# Patient Record
Sex: FEMALE | NOT HISPANIC OR LATINO | Employment: UNEMPLOYED | URBAN - NONMETROPOLITAN AREA
[De-identification: names, ages, dates, MRNs, and addresses within clinical notes are randomized per-mention and may not be internally consistent; named-entity substitution may affect disease eponyms.]

---

## 2021-11-10 ENCOUNTER — TRANSCRIBE ORDERS (OUTPATIENT)
Dept: ADMINISTRATIVE | Facility: HOSPITAL | Age: 66
End: 2021-11-10

## 2021-11-10 DIAGNOSIS — R09.89 DECREASED PULSES IN FEET: Primary | ICD-10-CM

## 2021-11-19 ENCOUNTER — APPOINTMENT (OUTPATIENT)
Dept: ULTRASOUND IMAGING | Facility: HOSPITAL | Age: 66
End: 2021-11-19

## 2021-11-23 ENCOUNTER — APPOINTMENT (OUTPATIENT)
Dept: ULTRASOUND IMAGING | Facility: HOSPITAL | Age: 66
End: 2021-11-23

## 2021-12-02 ENCOUNTER — APPOINTMENT (OUTPATIENT)
Dept: ULTRASOUND IMAGING | Facility: HOSPITAL | Age: 66
End: 2021-12-02

## 2021-12-10 ENCOUNTER — APPOINTMENT (OUTPATIENT)
Dept: ULTRASOUND IMAGING | Facility: HOSPITAL | Age: 66
End: 2021-12-10

## 2021-12-17 ENCOUNTER — APPOINTMENT (OUTPATIENT)
Dept: ULTRASOUND IMAGING | Facility: HOSPITAL | Age: 66
End: 2021-12-17

## 2022-01-04 ENCOUNTER — HOSPITAL ENCOUNTER (OUTPATIENT)
Dept: ULTRASOUND IMAGING | Facility: HOSPITAL | Age: 67
End: 2022-01-04

## 2022-01-13 ENCOUNTER — APPOINTMENT (OUTPATIENT)
Dept: ULTRASOUND IMAGING | Facility: HOSPITAL | Age: 67
End: 2022-01-13

## 2022-01-21 ENCOUNTER — APPOINTMENT (OUTPATIENT)
Dept: ULTRASOUND IMAGING | Facility: HOSPITAL | Age: 67
End: 2022-01-21

## 2022-01-28 ENCOUNTER — APPOINTMENT (OUTPATIENT)
Dept: ULTRASOUND IMAGING | Facility: HOSPITAL | Age: 67
End: 2022-01-28

## 2022-02-10 ENCOUNTER — APPOINTMENT (OUTPATIENT)
Dept: ULTRASOUND IMAGING | Facility: HOSPITAL | Age: 67
End: 2022-02-10

## 2022-02-24 ENCOUNTER — APPOINTMENT (OUTPATIENT)
Dept: ULTRASOUND IMAGING | Facility: HOSPITAL | Age: 67
End: 2022-02-24

## 2022-03-07 ENCOUNTER — APPOINTMENT (OUTPATIENT)
Dept: ULTRASOUND IMAGING | Facility: HOSPITAL | Age: 67
End: 2022-03-07

## 2023-01-05 ENCOUNTER — APPOINTMENT (OUTPATIENT)
Dept: GENERAL RADIOLOGY | Facility: HOSPITAL | Age: 68
DRG: 603 | End: 2023-01-05
Payer: MEDICARE

## 2023-01-05 ENCOUNTER — HOSPITAL ENCOUNTER (INPATIENT)
Facility: HOSPITAL | Age: 68
LOS: 4 days | Discharge: HOME HEALTH | DRG: 603 | End: 2023-01-10
Attending: STUDENT IN AN ORGANIZED HEALTH CARE EDUCATION/TRAINING PROGRAM | Admitting: INTERNAL MEDICINE
Payer: MEDICARE

## 2023-01-05 ENCOUNTER — APPOINTMENT (OUTPATIENT)
Dept: CT IMAGING | Facility: HOSPITAL | Age: 68
DRG: 603 | End: 2023-01-05
Payer: MEDICARE

## 2023-01-05 ENCOUNTER — APPOINTMENT (OUTPATIENT)
Dept: ULTRASOUND IMAGING | Facility: HOSPITAL | Age: 68
DRG: 603 | End: 2023-01-05
Payer: MEDICARE

## 2023-01-05 DIAGNOSIS — N17.9 AKI (ACUTE KIDNEY INJURY): Primary | ICD-10-CM

## 2023-01-05 DIAGNOSIS — L03.90 CELLULITIS, UNSPECIFIED CELLULITIS SITE: ICD-10-CM

## 2023-01-05 DIAGNOSIS — L03.116 CELLULITIS OF LEFT LOWER EXTREMITY: ICD-10-CM

## 2023-01-05 DIAGNOSIS — R53.1 WEAKNESS: ICD-10-CM

## 2023-01-05 LAB
ALBUMIN SERPL-MCNC: 3.4 G/DL (ref 3.5–5.2)
ALBUMIN/GLOB SERPL: 0.6 G/DL
ALP SERPL-CCNC: 193 U/L (ref 39–117)
ALT SERPL W P-5'-P-CCNC: 21 U/L (ref 1–33)
ANION GAP SERPL CALCULATED.3IONS-SCNC: 18.7 MMOL/L (ref 5–15)
AST SERPL-CCNC: 15 U/L (ref 1–32)
BACTERIA UR QL AUTO: ABNORMAL /HPF
BASOPHILS # BLD AUTO: 0.06 10*3/MM3 (ref 0–0.2)
BASOPHILS NFR BLD AUTO: 0.3 % (ref 0–1.5)
BILIRUB SERPL-MCNC: 0.5 MG/DL (ref 0–1.2)
BILIRUB UR QL STRIP: NEGATIVE
BUN SERPL-MCNC: 124 MG/DL (ref 8–23)
BUN/CREAT SERPL: 52.3 (ref 7–25)
CALCIUM SPEC-SCNC: 9.4 MG/DL (ref 8.6–10.5)
CHLORIDE SERPL-SCNC: 84 MMOL/L (ref 98–107)
CLARITY UR: ABNORMAL
CO2 SERPL-SCNC: 22.3 MMOL/L (ref 22–29)
COLOR UR: YELLOW
CREAT SERPL-MCNC: 2.37 MG/DL (ref 0.57–1)
CRP SERPL-MCNC: 4.55 MG/DL (ref 0–0.5)
D-LACTATE SERPL-SCNC: 2.2 MMOL/L (ref 0.5–2)
DEPRECATED RDW RBC AUTO: 48.2 FL (ref 37–54)
EGFRCR SERPLBLD CKD-EPI 2021: 22 ML/MIN/1.73
EOSINOPHIL # BLD AUTO: 0.01 10*3/MM3 (ref 0–0.4)
EOSINOPHIL NFR BLD AUTO: 0.1 % (ref 0.3–6.2)
ERYTHROCYTE [DISTWIDTH] IN BLOOD BY AUTOMATED COUNT: 16 % (ref 12.3–15.4)
ERYTHROCYTE [SEDIMENTATION RATE] IN BLOOD: 83 MM/HR (ref 0–30)
FLUAV RNA RESP QL NAA+PROBE: NOT DETECTED
FLUBV RNA RESP QL NAA+PROBE: NOT DETECTED
GLOBULIN UR ELPH-MCNC: 5.5 GM/DL
GLUCOSE SERPL-MCNC: 215 MG/DL (ref 65–99)
GLUCOSE UR STRIP-MCNC: NEGATIVE MG/DL
HCT VFR BLD AUTO: 41.4 % (ref 34–46.6)
HGB BLD-MCNC: 14.2 G/DL (ref 12–15.9)
HGB UR QL STRIP.AUTO: ABNORMAL
HOLD SPECIMEN: NORMAL
HOLD SPECIMEN: NORMAL
HYALINE CASTS UR QL AUTO: ABNORMAL /LPF
IMM GRANULOCYTES # BLD AUTO: 0.74 10*3/MM3 (ref 0–0.05)
IMM GRANULOCYTES NFR BLD AUTO: 3.7 % (ref 0–0.5)
KETONES UR QL STRIP: NEGATIVE
LEUKOCYTE ESTERASE UR QL STRIP.AUTO: ABNORMAL
LYMPHOCYTES # BLD AUTO: 1.07 10*3/MM3 (ref 0.7–3.1)
LYMPHOCYTES NFR BLD AUTO: 5.4 % (ref 19.6–45.3)
MCH RBC QN AUTO: 28.5 PG (ref 26.6–33)
MCHC RBC AUTO-ENTMCNC: 34.3 G/DL (ref 31.5–35.7)
MCV RBC AUTO: 83.1 FL (ref 79–97)
MONOCYTES # BLD AUTO: 1.18 10*3/MM3 (ref 0.1–0.9)
MONOCYTES NFR BLD AUTO: 5.9 % (ref 5–12)
NEUTROPHILS NFR BLD AUTO: 16.8 10*3/MM3 (ref 1.7–7)
NEUTROPHILS NFR BLD AUTO: 84.6 % (ref 42.7–76)
NITRITE UR QL STRIP: NEGATIVE
NRBC BLD AUTO-RTO: 0 /100 WBC (ref 0–0.2)
NT-PROBNP SERPL-MCNC: 164 PG/ML (ref 0–900)
PH UR STRIP.AUTO: <=5 [PH] (ref 5–8)
PLATELET # BLD AUTO: 429 10*3/MM3 (ref 140–450)
PMV BLD AUTO: 9.6 FL (ref 6–12)
POTASSIUM SERPL-SCNC: 4.1 MMOL/L (ref 3.5–5.2)
PROT SERPL-MCNC: 8.9 G/DL (ref 6–8.5)
PROT UR QL STRIP: ABNORMAL
RBC # BLD AUTO: 4.98 10*6/MM3 (ref 3.77–5.28)
RBC # UR STRIP: ABNORMAL /HPF
REF LAB TEST METHOD: ABNORMAL
SARS-COV-2 RNA RESP QL NAA+PROBE: NOT DETECTED
SODIUM SERPL-SCNC: 125 MMOL/L (ref 136–145)
SP GR UR STRIP: 1.02 (ref 1–1.03)
SQUAMOUS #/AREA URNS HPF: ABNORMAL /HPF
TROPONIN T SERPL-MCNC: <0.01 NG/ML (ref 0–0.03)
UROBILINOGEN UR QL STRIP: ABNORMAL
WBC # UR STRIP: ABNORMAL /HPF
WBC NRBC COR # BLD: 19.86 10*3/MM3 (ref 3.4–10.8)
WHOLE BLOOD HOLD COAG: NORMAL
WHOLE BLOOD HOLD SPECIMEN: NORMAL
YEAST URNS QL MICRO: ABNORMAL /HPF

## 2023-01-05 PROCEDURE — 87040 BLOOD CULTURE FOR BACTERIA: CPT | Performed by: STUDENT IN AN ORGANIZED HEALTH CARE EDUCATION/TRAINING PROGRAM

## 2023-01-05 PROCEDURE — 84484 ASSAY OF TROPONIN QUANT: CPT | Performed by: PHYSICIAN ASSISTANT

## 2023-01-05 PROCEDURE — 71045 X-RAY EXAM CHEST 1 VIEW: CPT

## 2023-01-05 PROCEDURE — 86140 C-REACTIVE PROTEIN: CPT | Performed by: PHYSICIAN ASSISTANT

## 2023-01-05 PROCEDURE — 99285 EMERGENCY DEPT VISIT HI MDM: CPT

## 2023-01-05 PROCEDURE — 99223 1ST HOSP IP/OBS HIGH 75: CPT | Performed by: HOSPITALIST

## 2023-01-05 PROCEDURE — 85025 COMPLETE CBC W/AUTO DIFF WBC: CPT | Performed by: PHYSICIAN ASSISTANT

## 2023-01-05 PROCEDURE — 85652 RBC SED RATE AUTOMATED: CPT | Performed by: PHYSICIAN ASSISTANT

## 2023-01-05 PROCEDURE — 99284 EMERGENCY DEPT VISIT MOD MDM: CPT

## 2023-01-05 PROCEDURE — 93970 EXTREMITY STUDY: CPT

## 2023-01-05 PROCEDURE — 81001 URINALYSIS AUTO W/SCOPE: CPT | Performed by: STUDENT IN AN ORGANIZED HEALTH CARE EDUCATION/TRAINING PROGRAM

## 2023-01-05 PROCEDURE — 93010 ELECTROCARDIOGRAM REPORT: CPT | Performed by: INTERNAL MEDICINE

## 2023-01-05 PROCEDURE — 87086 URINE CULTURE/COLONY COUNT: CPT | Performed by: STUDENT IN AN ORGANIZED HEALTH CARE EDUCATION/TRAINING PROGRAM

## 2023-01-05 PROCEDURE — 73590 X-RAY EXAM OF LOWER LEG: CPT

## 2023-01-05 PROCEDURE — 83880 ASSAY OF NATRIURETIC PEPTIDE: CPT | Performed by: PHYSICIAN ASSISTANT

## 2023-01-05 PROCEDURE — 73552 X-RAY EXAM OF FEMUR 2/>: CPT

## 2023-01-05 PROCEDURE — 83036 HEMOGLOBIN GLYCOSYLATED A1C: CPT | Performed by: HOSPITALIST

## 2023-01-05 PROCEDURE — 70450 CT HEAD/BRAIN W/O DYE: CPT

## 2023-01-05 PROCEDURE — 83605 ASSAY OF LACTIC ACID: CPT | Performed by: STUDENT IN AN ORGANIZED HEALTH CARE EDUCATION/TRAINING PROGRAM

## 2023-01-05 PROCEDURE — 36415 COLL VENOUS BLD VENIPUNCTURE: CPT

## 2023-01-05 PROCEDURE — 80053 COMPREHEN METABOLIC PANEL: CPT | Performed by: PHYSICIAN ASSISTANT

## 2023-01-05 PROCEDURE — 87636 SARSCOV2 & INF A&B AMP PRB: CPT | Performed by: STUDENT IN AN ORGANIZED HEALTH CARE EDUCATION/TRAINING PROGRAM

## 2023-01-05 PROCEDURE — 93005 ELECTROCARDIOGRAM TRACING: CPT | Performed by: PHYSICIAN ASSISTANT

## 2023-01-05 PROCEDURE — 25010000002 VANCOMYCIN 5 G RECONSTITUTED SOLUTION: Performed by: STUDENT IN AN ORGANIZED HEALTH CARE EDUCATION/TRAINING PROGRAM

## 2023-01-05 RX ADMIN — VANCOMYCIN HYDROCHLORIDE 2000 MG: 5 INJECTION, POWDER, LYOPHILIZED, FOR SOLUTION INTRAVENOUS at 22:57

## 2023-01-05 RX ADMIN — SODIUM CHLORIDE 1000 ML: 9 INJECTION, SOLUTION INTRAVENOUS at 22:57

## 2023-01-05 NOTE — ED NOTES
MEDICAL SCREENING:    Reason for Visit: BLE pain and swelling; weakness     Patient initially seen in triage.  The patient was advised further evaluation and diagnostic testing will be needed, some of the treatment and testing will be initiated in the lobby in order to begin the process.  The patient will be returned to the waiting area for the time being and possibly be re-assessed by a subsequent ED provider.  The patient will be brought back to the treatment area in as timely manner as possible.       Hanna Pérez PA  01/05/23 [id number]

## 2023-01-06 ENCOUNTER — APPOINTMENT (OUTPATIENT)
Dept: CARDIOLOGY | Facility: HOSPITAL | Age: 68
DRG: 603 | End: 2023-01-06
Payer: MEDICARE

## 2023-01-06 ENCOUNTER — APPOINTMENT (OUTPATIENT)
Dept: ULTRASOUND IMAGING | Facility: HOSPITAL | Age: 68
DRG: 603 | End: 2023-01-06
Payer: MEDICARE

## 2023-01-06 PROBLEM — L03.119 CELLULITIS OF LOWER EXTREMITY: Status: ACTIVE | Noted: 2023-01-06

## 2023-01-06 LAB
ALBUMIN SERPL-MCNC: 2.8 G/DL (ref 3.5–5.2)
ALBUMIN/GLOB SERPL: 0.6 G/DL
ALP SERPL-CCNC: 163 U/L (ref 39–117)
ALT SERPL W P-5'-P-CCNC: 21 U/L (ref 1–33)
ANION GAP SERPL CALCULATED.3IONS-SCNC: 18.6 MMOL/L (ref 5–15)
ANISOCYTOSIS BLD QL: ABNORMAL
AST SERPL-CCNC: 20 U/L (ref 1–32)
BILIRUB SERPL-MCNC: 0.4 MG/DL (ref 0–1.2)
BUN SERPL-MCNC: 111 MG/DL (ref 8–23)
BUN/CREAT SERPL: 58.1 (ref 7–25)
CALCIUM SPEC-SCNC: 8.9 MG/DL (ref 8.6–10.5)
CHLORIDE SERPL-SCNC: 85 MMOL/L (ref 98–107)
CHOLEST SERPL-MCNC: 214 MG/DL (ref 0–200)
CO2 SERPL-SCNC: 16.4 MMOL/L (ref 22–29)
CREAT SERPL-MCNC: 1.91 MG/DL (ref 0.57–1)
CRP SERPL-MCNC: 3.49 MG/DL (ref 0–0.5)
D-LACTATE SERPL-SCNC: 1.6 MMOL/L (ref 0.5–2)
DEPRECATED RDW RBC AUTO: 48.1 FL (ref 37–54)
EGFRCR SERPLBLD CKD-EPI 2021: 28.5 ML/MIN/1.73
EOSINOPHIL # BLD MANUAL: 0.39 10*3/MM3 (ref 0–0.4)
EOSINOPHIL NFR BLD MANUAL: 2 % (ref 0.3–6.2)
ERYTHROCYTE [DISTWIDTH] IN BLOOD BY AUTOMATED COUNT: 15.9 % (ref 12.3–15.4)
GLOBULIN UR ELPH-MCNC: 5 GM/DL
GLUCOSE BLDC GLUCOMTR-MCNC: 168 MG/DL (ref 70–130)
GLUCOSE BLDC GLUCOMTR-MCNC: 182 MG/DL (ref 70–130)
GLUCOSE BLDC GLUCOMTR-MCNC: 215 MG/DL (ref 70–130)
GLUCOSE BLDC GLUCOMTR-MCNC: 226 MG/DL (ref 70–130)
GLUCOSE SERPL-MCNC: 164 MG/DL (ref 65–99)
HBA1C MFR BLD: 7.6 % (ref 4.8–5.6)
HCT VFR BLD AUTO: 38.8 % (ref 34–46.6)
HDLC SERPL-MCNC: 36 MG/DL (ref 40–60)
HGB BLD-MCNC: 13.3 G/DL (ref 12–15.9)
LDLC SERPL CALC-MCNC: 148 MG/DL (ref 0–100)
LDLC/HDLC SERPL: 4.03 {RATIO}
LYMPHOCYTES # BLD MANUAL: 3.69 10*3/MM3 (ref 0.7–3.1)
LYMPHOCYTES NFR BLD MANUAL: 6 % (ref 5–12)
MCH RBC QN AUTO: 28.6 PG (ref 26.6–33)
MCHC RBC AUTO-ENTMCNC: 34.3 G/DL (ref 31.5–35.7)
MCV RBC AUTO: 83.4 FL (ref 79–97)
METAMYELOCYTES NFR BLD MANUAL: 1 % (ref 0–0)
MONOCYTES # BLD: 1.17 10*3/MM3 (ref 0.1–0.9)
MYELOCYTES NFR BLD MANUAL: 1 % (ref 0–0)
NEUTROPHILS # BLD AUTO: 13.8 10*3/MM3 (ref 1.7–7)
NEUTROPHILS NFR BLD MANUAL: 66 % (ref 42.7–76)
NEUTS BAND NFR BLD MANUAL: 5 % (ref 0–5)
PLAT MORPH BLD: NORMAL
PLATELET # BLD AUTO: 373 10*3/MM3 (ref 140–450)
PMV BLD AUTO: 9.7 FL (ref 6–12)
POTASSIUM SERPL-SCNC: 4 MMOL/L (ref 3.5–5.2)
PROT SERPL-MCNC: 7.8 G/DL (ref 6–8.5)
RBC # BLD AUTO: 4.65 10*6/MM3 (ref 3.77–5.28)
SODIUM SERPL-SCNC: 120 MMOL/L (ref 136–145)
TRIGL SERPL-MCNC: 165 MG/DL (ref 0–150)
VANCOMYCIN SERPL-MCNC: 13.8 MCG/ML (ref 5–40)
VARIANT LYMPHS NFR BLD MANUAL: 19 % (ref 19.6–45.3)
VLDLC SERPL-MCNC: 30 MG/DL (ref 5–40)
WBC NRBC COR # BLD: 19.44 10*3/MM3 (ref 3.4–10.8)

## 2023-01-06 PROCEDURE — 99221 1ST HOSP IP/OBS SF/LOW 40: CPT | Performed by: SURGERY

## 2023-01-06 PROCEDURE — 82962 GLUCOSE BLOOD TEST: CPT

## 2023-01-06 PROCEDURE — 63710000001 INSULIN ASPART PER 5 UNITS: Performed by: HOSPITALIST

## 2023-01-06 PROCEDURE — 86140 C-REACTIVE PROTEIN: CPT | Performed by: HOSPITALIST

## 2023-01-06 PROCEDURE — 80053 COMPREHEN METABOLIC PANEL: CPT | Performed by: HOSPITALIST

## 2023-01-06 PROCEDURE — 80061 LIPID PANEL: CPT | Performed by: HOSPITALIST

## 2023-01-06 PROCEDURE — 97162 PT EVAL MOD COMPLEX 30 MIN: CPT

## 2023-01-06 PROCEDURE — 25010000002 CEFTRIAXONE PER 250 MG: Performed by: HOSPITALIST

## 2023-01-06 PROCEDURE — 25010000002 CEFTRIAXONE PER 250 MG: Performed by: STUDENT IN AN ORGANIZED HEALTH CARE EDUCATION/TRAINING PROGRAM

## 2023-01-06 PROCEDURE — 25010000002 VANCOMYCIN 1 G RECONSTITUTED SOLUTION 1 EACH VIAL: Performed by: HOSPITALIST

## 2023-01-06 PROCEDURE — 76775 US EXAM ABDO BACK WALL LIM: CPT

## 2023-01-06 PROCEDURE — 83605 ASSAY OF LACTIC ACID: CPT | Performed by: STUDENT IN AN ORGANIZED HEALTH CARE EDUCATION/TRAINING PROGRAM

## 2023-01-06 PROCEDURE — 25010000002 MORPHINE PER 10 MG: Performed by: STUDENT IN AN ORGANIZED HEALTH CARE EDUCATION/TRAINING PROGRAM

## 2023-01-06 PROCEDURE — 25010000002 HEPARIN (PORCINE) PER 1000 UNITS: Performed by: HOSPITALIST

## 2023-01-06 PROCEDURE — 76775 US EXAM ABDO BACK WALL LIM: CPT | Performed by: RADIOLOGY

## 2023-01-06 PROCEDURE — 80202 ASSAY OF VANCOMYCIN: CPT | Performed by: HOSPITALIST

## 2023-01-06 PROCEDURE — 85025 COMPLETE CBC W/AUTO DIFF WBC: CPT | Performed by: HOSPITALIST

## 2023-01-06 RX ORDER — SODIUM CHLORIDE 0.9 % (FLUSH) 0.9 %
10 SYRINGE (ML) INJECTION AS NEEDED
Status: DISCONTINUED | OUTPATIENT
Start: 2023-01-06 | End: 2023-01-10 | Stop reason: HOSPADM

## 2023-01-06 RX ORDER — PRAVASTATIN SODIUM 40 MG
20 TABLET ORAL NIGHTLY
Status: DISCONTINUED | OUTPATIENT
Start: 2023-01-06 | End: 2023-01-10 | Stop reason: HOSPADM

## 2023-01-06 RX ORDER — DEXTROSE MONOHYDRATE 25 G/50ML
25 INJECTION, SOLUTION INTRAVENOUS
Status: DISCONTINUED | OUTPATIENT
Start: 2023-01-06 | End: 2023-01-10 | Stop reason: HOSPADM

## 2023-01-06 RX ORDER — AMLODIPINE BESYLATE 10 MG/1
10 TABLET ORAL DAILY
Status: CANCELLED | OUTPATIENT
Start: 2023-01-06

## 2023-01-06 RX ORDER — AMLODIPINE BESYLATE 10 MG/1
10 TABLET ORAL DAILY
Status: ON HOLD | COMMUNITY
End: 2023-01-10 | Stop reason: SDUPTHER

## 2023-01-06 RX ORDER — METOPROLOL TARTRATE 50 MG/1
50 TABLET, FILM COATED ORAL 2 TIMES DAILY
Status: CANCELLED | OUTPATIENT
Start: 2023-01-06

## 2023-01-06 RX ORDER — SODIUM CHLORIDE 0.9 % (FLUSH) 0.9 %
10 SYRINGE (ML) INJECTION EVERY 12 HOURS SCHEDULED
Status: DISCONTINUED | OUTPATIENT
Start: 2023-01-06 | End: 2023-01-10 | Stop reason: HOSPADM

## 2023-01-06 RX ORDER — HEPARIN SODIUM 5000 [USP'U]/ML
5000 INJECTION, SOLUTION INTRAVENOUS; SUBCUTANEOUS EVERY 12 HOURS SCHEDULED
Status: DISCONTINUED | OUTPATIENT
Start: 2023-01-06 | End: 2023-01-10 | Stop reason: HOSPADM

## 2023-01-06 RX ORDER — SODIUM CHLORIDE 9 MG/ML
100 INJECTION, SOLUTION INTRAVENOUS CONTINUOUS
Status: DISCONTINUED | OUTPATIENT
Start: 2023-01-06 | End: 2023-01-09

## 2023-01-06 RX ORDER — AMLODIPINE BESYLATE 10 MG/1
10 TABLET ORAL DAILY
Status: DISCONTINUED | OUTPATIENT
Start: 2023-01-07 | End: 2023-01-10 | Stop reason: HOSPADM

## 2023-01-06 RX ORDER — PRAVASTATIN SODIUM 20 MG
20 TABLET ORAL NIGHTLY
Status: ON HOLD | COMMUNITY
End: 2023-01-10 | Stop reason: SDUPTHER

## 2023-01-06 RX ORDER — METOPROLOL TARTRATE 50 MG/1
50 TABLET, FILM COATED ORAL 2 TIMES DAILY
Status: ON HOLD | COMMUNITY
End: 2023-01-10 | Stop reason: SDUPTHER

## 2023-01-06 RX ORDER — HYDROCHLOROTHIAZIDE 25 MG/1
25 TABLET ORAL DAILY
Status: CANCELLED | OUTPATIENT
Start: 2023-01-06

## 2023-01-06 RX ORDER — PRAVASTATIN SODIUM 40 MG
20 TABLET ORAL NIGHTLY
Status: CANCELLED | OUTPATIENT
Start: 2023-01-06

## 2023-01-06 RX ORDER — NITROGLYCERIN 0.4 MG/1
0.4 TABLET SUBLINGUAL
Status: DISCONTINUED | OUTPATIENT
Start: 2023-01-06 | End: 2023-01-10 | Stop reason: HOSPADM

## 2023-01-06 RX ORDER — HYDROCHLOROTHIAZIDE 25 MG/1
25 TABLET ORAL DAILY
COMMUNITY
End: 2023-01-10 | Stop reason: HOSPADM

## 2023-01-06 RX ORDER — METOPROLOL TARTRATE 50 MG/1
50 TABLET, FILM COATED ORAL 2 TIMES DAILY
Status: DISCONTINUED | OUTPATIENT
Start: 2023-01-06 | End: 2023-01-10 | Stop reason: HOSPADM

## 2023-01-06 RX ORDER — INSULIN ASPART 100 [IU]/ML
0-7 INJECTION, SOLUTION INTRAVENOUS; SUBCUTANEOUS
Status: DISCONTINUED | OUTPATIENT
Start: 2023-01-06 | End: 2023-01-10 | Stop reason: HOSPADM

## 2023-01-06 RX ORDER — NYSTATIN 100000 [USP'U]/G
POWDER TOPICAL EVERY 12 HOURS SCHEDULED
Status: DISCONTINUED | OUTPATIENT
Start: 2023-01-06 | End: 2023-01-10 | Stop reason: HOSPADM

## 2023-01-06 RX ORDER — SODIUM CHLORIDE 9 MG/ML
40 INJECTION, SOLUTION INTRAVENOUS AS NEEDED
Status: DISCONTINUED | OUTPATIENT
Start: 2023-01-06 | End: 2023-01-10 | Stop reason: HOSPADM

## 2023-01-06 RX ORDER — NICOTINE POLACRILEX 4 MG
15 LOZENGE BUCCAL
Status: DISCONTINUED | OUTPATIENT
Start: 2023-01-06 | End: 2023-01-10 | Stop reason: HOSPADM

## 2023-01-06 RX ADMIN — INSULIN ASPART 2 UNITS: 100 INJECTION, SOLUTION INTRAVENOUS; SUBCUTANEOUS at 12:30

## 2023-01-06 RX ADMIN — NYSTATIN: 100000 POWDER TOPICAL at 21:21

## 2023-01-06 RX ADMIN — SODIUM CHLORIDE 75 ML/HR: 9 INJECTION, SOLUTION INTRAVENOUS at 18:16

## 2023-01-06 RX ADMIN — INSULIN ASPART 2 UNITS: 100 INJECTION, SOLUTION INTRAVENOUS; SUBCUTANEOUS at 09:03

## 2023-01-06 RX ADMIN — Medication 10 ML: at 09:04

## 2023-01-06 RX ADMIN — CEFTRIAXONE 1 G: 1 INJECTION, POWDER, FOR SOLUTION INTRAMUSCULAR; INTRAVENOUS at 01:06

## 2023-01-06 RX ADMIN — SODIUM CHLORIDE 75 ML/HR: 9 INJECTION, SOLUTION INTRAVENOUS at 03:20

## 2023-01-06 RX ADMIN — VANCOMYCIN HYDROCHLORIDE 1000 MG: 1 INJECTION, POWDER, LYOPHILIZED, FOR SOLUTION INTRAVENOUS at 18:18

## 2023-01-06 RX ADMIN — Medication 10 ML: at 21:21

## 2023-01-06 RX ADMIN — HEPARIN SODIUM 5000 UNITS: 5000 INJECTION INTRAVENOUS; SUBCUTANEOUS at 21:21

## 2023-01-06 RX ADMIN — MORPHINE SULFATE 4 MG: 4 INJECTION, SOLUTION INTRAMUSCULAR; INTRAVENOUS at 02:03

## 2023-01-06 RX ADMIN — INSULIN ASPART 3 UNITS: 100 INJECTION, SOLUTION INTRAVENOUS; SUBCUTANEOUS at 18:17

## 2023-01-06 RX ADMIN — HEPARIN SODIUM 5000 UNITS: 5000 INJECTION INTRAVENOUS; SUBCUTANEOUS at 09:04

## 2023-01-06 RX ADMIN — CEFTRIAXONE 1 G: 1 INJECTION, POWDER, FOR SOLUTION INTRAMUSCULAR; INTRAVENOUS at 21:21

## 2023-01-06 NOTE — CONSULTS
Consulting physician: SELAM Eubanks and Dr. Armando    Referring physician: Dr. Carpio    Date of consultation: 01/06/23     Chief complaint cellulitis, possible leg abscess at popliteal fossa and left heel    Subjective     Patient is a 67 y.o. female who presents with left leg swelling.  She reports that it has been swollen and draining x4 months.  She also reports that she has had weakness and has been unable to get around with her walker at home.  She does complain of nausea, 2 episodes of vomiting as well as diarrhea. She has chronic lymphedema in the legs due to her obesity and has a lot of varicose veins and venous stasis skin changes in the left leg more than the right. The redness she had above the left knee is improved today after getting IV antibiotics.      Review of Systems  Review of Systems - General ROS: Positive for weakness  Psychological ROS: negative for - behavioral disorder  Ophthalmic ROS: negative for - dry eyes  ENT ROS: negative for - vertigo or vocal changes  Hematological and Lymphatic ROS: negative for - swollen lymph nodes, DVT, PE.   Respiratory ROS: negative for - sputum changes or stridor.  Cardiovascular ROS: negative for - irregular heartbeat or murmur  Gastrointestinal ROS: Positive for nausea vomiting and diarrhea.  Genitourinary ROS: negative for - hematuria or incontinence  Musculoskeletal ROS: Positive for lower extremity swelling.  Integumentary ROS: Positive for redness.    History  Past Medical History:   Diagnosis Date   • Hyperlipidemia    • Hypertension      Past Surgical History:   Procedure Laterality Date   • CHOLECYSTECTOMY       Family History   Problem Relation Age of Onset   • Hypertension Mother      Social History     Tobacco Use   • Smoking status: Never   • Smokeless tobacco: Never   Substance Use Topics   • Alcohol use: Never   • Drug use: Never     No medications prior to admission.     Allergies:  Patient has no known allergies.    Objective     Vital  Signs  Temp:  [96.9 °F (36.1 °C)-98.6 °F (37 °C)] 97.4 °F (36.3 °C)  Heart Rate:  [64-70] 64  Resp:  [18-19] 18  BP: (105-134)/(49-70) 105/49    Physical Exam:  General:  This is an obese female in no acute distress  Vital signs: Stable, afebrile  HEENT exam:  WNL. Sclerae are anicteric.  EOMI  Neck:  Supple, FROM.  No JVD.  Trachea midline  Lungs:  Respiratory effort normal.  Heart:  Regular rate and rhythm, without murmur, gallop, rub.  No pedal edema  Abdomen: Nondistended.  Musculoskeletal:  Muscle strength/tone is normal.    Psych:  Alert, oriented x 3.  Mood and affect are appropriate  Skin:  Warm with good turgor.  Without rash or lesion  Extremities: Cellulitis noted to patient's left upper thigh.  There is drainage noted to posterior left lower extremity around popliteal fossa.  There is no fluctuation noted in this area.  Patient did also have drainage noted around left heel.  There was no fluctuation noted to this area.  Edema noted to bilateral lower extremities.    Results Review:   Results from last 7 days   Lab Units 01/05/23  1838   TROPONIN T ng/mL <0.010     Results from last 7 days   Lab Units 01/06/23  0453 01/06/23  0201 01/05/23  2253 01/05/23  1838   CRP mg/dL 3.49*  --   --  4.55*   LACTATE mmol/L  --  1.6 2.2*  --    WBC 10*3/mm3 19.44*  --   --  19.86*   HEMOGLOBIN g/dL 13.3  --   --  14.2   HEMATOCRIT % 38.8  --   --  41.4   PLATELETS 10*3/mm3 373  --   --  429         Results from last 7 days   Lab Units 01/06/23  0453 01/05/23  1838   SODIUM mmol/L 120* 125*   POTASSIUM mmol/L 4.0 4.1   CHLORIDE mmol/L 85* 84*   CO2 mmol/L 16.4* 22.3   BUN mg/dL 111* 124*   CREATININE mg/dL 1.91* 2.37*   CALCIUM mg/dL 8.9 9.4   GLUCOSE mg/dL 164* 215*   ALBUMIN g/dL 2.8* 3.4*   BILIRUBIN mg/dL 0.4 0.5   ALK PHOS U/L 163* 193*   AST (SGOT) U/L 20 15   ALT (SGPT) U/L 21 21   Estimated Creatinine Clearance: 39.8 mL/min (A) (by C-G formula based on SCr of 1.91 mg/dL (H)).  No results found for:  AMMONIA  Results from last 7 days   Lab Units 01/06/23  0453   CHOLESTEROL mg/dL 214*   TRIGLYCERIDES mg/dL 165*   HDL CHOL mg/dL 36*   LDL CHOL mg/dL 148*     No results found for: BLOODCX  No results found for: URINECX  No results found for: WOUNDCX  No results found for: STOOLCX    Imaging:  Imaging Results (Last 24 Hours)     Procedure Component Value Units Date/Time    XR Tibia Fibula 2 View Right [[id number]] Collected: 01/05/23 2228     Updated: 01/05/23 2230    Narrative:      CR Tibia Fibula 2 Vws RT    INDICATION:   Leg swelling.    COMPARISON:   None available.    FINDINGS:   AP and lateral views of the right tibia/fibula.     Subcutaneous soft tissue edema. No fracture or dislocation. Tricompartmental knee osteoarthrosis, most advanced in the medial compartment. Visualized portions of the ankle joints are in anatomic alignment. No foreign body.      Impression:      1.  Subcutaneous edema. No acute osseous abnormality.  2.  Tricompartmental knee osteoarthrosis.    Signer Name: Ezio Boyer MD   Signed: 1/5/2023 10:28 PM   Workstation Name: [id number]    [facility]    XR Femur 2 View Left [[id number]] Collected: 01/05/23 2227     Updated: 01/05/23 2229    Narrative:      CR Femur 2 Vws LT    INDICATION:   left leg eyrthema and pain    COMPARISON:   None available.    FINDINGS:   AP and lateral views of the left femur.      Evaluation of the proximal femur is limited due to patient body habitus. No fracture or dislocation. Tricompartmental osteoarthrosis of the knee.  No foreign body.      Impression:      1.  Evaluation of the proximal femur is limited due to patient body habitus. No acute osseous findings.  2.  Tricompartmental knee osteoarthrosis.    Signer Name: Ezio Boyer MD   Signed: 1/5/2023 10:27 PM   Workstation Name: [id number]    [facility]    XR Tibia Fibula 2 View Left [[id number]] Collected: 01/05/23 2226     Updated: 01/05/23 2228    Narrative:       CR Tibia Fibula 2 Vws LT    INDICATION:   Erythema.    COMPARISON:   None available.    FINDINGS:   AP and lateral views of the left tibia/fibula.      Subcutaneous soft tissue edema. No fracture or dislocation.  Tricompartmental osteoarthrosis, most advanced in the medial compartment. Visualized ankle joint is anatomic alignment. Plantar calcaneal spur. No foreign body.      Impression:      1.  No fracture or dislocation.  2.  Subcutaneous soft tissue edema.  3.  Tricompartmental knee osteoarthrosis.    Signer Name: Ezio Boyer MD   Signed: 1/5/2023 10:26 PM   Workstation Name: [id number]    [facility]    XR Chest 1 View [[id number]] Collected: 01/05/23 2004     Updated: 01/05/23 2006    Narrative:      CR Chest 1 Vw    INDICATION:   Bilateral lower extremity swelling     COMPARISON:    July 7, 2007    FINDINGS:  Portable AP view(s) of the chest.    The heart size is enlarged. Mediastinal structures are midline. Pulmonary vascularity is normal. The lungs are clear. No pleural fluid. No pneumothorax.      Impression:      Cardiomegaly. No acute infiltrates.    Signer Name: Donte Mahmood MD   Signed: 1/5/2023 8:04 PM   Workstation Name: [id number]    [facility]    US Venous Doppler Lower Extremity Bilateral (duplex) [[id number]] Collected: 01/05/23 2001     Updated: 01/05/23 2003    Narrative:      US Veins LE Duplex BILAT    HISTORY:   Bilateral leg swelling and pain    TECHNIQUE:   Real-time ultrasound was performed of both lower extremities utilizing spectral and color Doppler with compression and augmentation techniques.    COMPARISON:  None available.    FINDINGS:  Right Lower Extremity:  There is no deep venous thrombus seen in the right lower extremity, including the right common femoral veins, right femoral veins and right popliteal veins.  Normal compressibility and respiratory phasicity was visualized.  No calf vein thrombus. Greater  saphenous vein is  patent.    Left Lower Extremity:  There is no deep venous thrombus seen in the left lower extremity, including the left common femoral veins, left femoral veins and left popliteal veins.   Normal compressibility and respiratory phasicity was visualized.  No calf vein thrombus. Greater  saphenous vein is patent.        Impression:      No deep venous thrombus seen in either lower extremity.    Signer Name: Donte Mahmood MD   Signed: 1/5/2023 8:01 PM   Workstation Name: [id number]    [facility]    CT Head Without Contrast [[id number]] Collected: 01/05/23 2000     Updated: 01/05/23 2002    Narrative:      CT Head WO    HISTORY: weakness    COMPARISON: None.    TECHNIQUE: CT of the brain without IV contrast. Coronal and sagittal reconstructions were obtained.  Radiation dose reduction techniques included automated exposure control or exposure modulation based on body size. Count of known CT and cardiac nuc med  studies performed in previous 12 months: 0.     Time of Scan: 7:46 PM    FINDINGS:    No acute intracranial hemorrhage, mass effect, midline shift, or hydrocephalus.     Gray-white matter differentiation is within normal limits. Mild patchy hypodensities in the cerebral white matter, nonspecific but likely representing chronic microvascular ischemic changes. Right thalamic or capsular chronic lacunar infarct.    Ventricles and sulci are normal in size. Basal cisterns are clear.     Calvarium is intact.     Layering fluid in the left sphenoid sinus. Visualized mastoid air cells are clear.      Impression:        1.  No acute intracranial hemorrhage or mass effect.  2.  Chronic microvascular ischemic changes and generalized cerebral atrophy.  3.  Right thalamic or capsular chronic lacunar infarcts.  4.  Layering fluid in the left sphenoid sinus. Correlate with symptoms of sinusitis.    Signer Name: Ezio Boyer MD   Signed: 1/5/2023 8:00 PM   Workstation Name: [id number]    [facility]        Reviewed imaging    Impression: 67-year-old female with cellulitis to left lower extremity  Patient Active Problem List   Diagnosis Code   • Cellulitis of lower extremity L03.119       Plan:  Currently there is no indication of a abscess and incision would just result in a likely non healing wound draining serous fluid . I Recommend elevate the leg when possible and ace wrap or compression stockings. I did discuss the importance of weight loss with the patient if there is to be any long term improvement.       Discussion:       SELAM Vivas  01/06/23  10:58 EST    Time: Time spent:    Please note that portions of this note were completed with a voice recognition program.

## 2023-01-06 NOTE — PLAN OF CARE
Goal Outcome Evaluation:      Pt admitted this shift. Pt is currently resting comfortably. No s/s of acute distress noted at this time. Wound consulted. Will continue with plan of care.

## 2023-01-06 NOTE — PLAN OF CARE
Goal Outcome Evaluation:      Patient has been resting in bed and bedside chair this shift. Wound care completed per orders. No s/s of acute distress noted at this time. Will continue to follow plan of care.

## 2023-01-06 NOTE — NURSING NOTE
Patient with stage 1 PI to her sacrum.  Red and nonblanchable.  2x3cm.  She has a small stage 2 PI to her right gluteal  1x1cm.  Red and nonblanchable.  Will treat both areas with Z-Guard twice daily.    PI prevention orders initiated.    Patient with cellulitis of LLE.  She has an abscess to the back of her knee as well as a large, macerated blister to her left heel.  Surgery has been consulted for these wounds.       01/06/23 1130   Wound 01/06/23 0440 Bilateral midline sacral spine Pressure Injury   Placement Date/Time: 01/06/23 0440   Present on Hospital Admission: Yes  Side: Bilateral  Orientation: midline  Location: sacral spine  Primary Wound Type: Pressure Injury   Pressure Injury Stage 1   Base red;non-blanchable   Periwound intact   Periwound Temperature warm   Periwound Skin Turgor soft   Edges rolled/closed   Wound Length (cm) 2 cm   Wound Width (cm) 3 cm   Wound Surface Area (cm^2) 6 cm^2   Drainage Amount none   Wound 01/06/23 0442 Right medial gluteal Pressure Injury   Placement Date/Time: 01/06/23 0442   Present on Hospital Admission: Yes  Side: Right  Orientation: medial  Location: gluteal  Primary Wound Type: Pressure Injury   Pressure Injury Stage 2   Dressing Appearance open to air   Closure None   Base red;non-blanchable;moist   Periwound intact   Periwound Temperature warm   Periwound Skin Turgor soft   Edges open   Wound Length (cm) 1 cm   Wound Width (cm) 1 cm   Wound Depth (cm) 0.1 cm   Wound Surface Area (cm^2) 1 cm^2   Wound Volume (cm^3) 0.1 cm^3   Drainage Amount none

## 2023-01-06 NOTE — PAYOR COMM NOTE
"[facility]  NPI:[id number]    Utilization Review  Contact: Tatiana Varela RN  Phone: [phone]  Fax:[phone]    INITIATE INPATIENT AUTHORIZATION    Lynnette Santos (67 y.o. Female)     Date of Birth   1955    Social Security Number       Address   [address] [place] [address]    Home Phone   [phone]    MRN   [id number]       Spiritism   None    Marital Status   Unknown                            Admission Date   1/5/23    Admission Type   Emergency    Admitting Provider   Bib Parsons MD    Attending Provider   Mariam Carpio DO    Department, Room/Bed   [facility], 3315/1S       Discharge Date       Discharge Disposition       Discharge Destination                               Attending Provider: Mariam Carpio DO    Allergies: No Known Allergies    Isolation: None   Infection: None   Code Status: CPR    Ht: 165.1 cm (65\")   Wt: 135 kg (297 lb 4.8 oz)    Admission Cmt: None   Principal Problem: Cellulitis of lower extremity [L03.119]                 Active Insurance as of 1/5/2023     Primary Coverage     Payor Plan Insurance Group Employer/Plan Group    [facility] MEDICARE REPLACEMENT [employer] MEDICARE REPLACEMENT       Payor Plan Address Payor Plan Phone Number Payor Plan Fax Number Effective Dates    [address] [address] [phone]  11/1/2021 - None Entered    [facility] [id number]       Subscriber Name Subscriber Birth Date Member ID       LYNNETTE SANTOS 1955 [id number]           Secondary Coverage     Payor Plan Insurance Group Employer/Plan Group    [id number]      Payor Plan Address Payor Plan Phone Number Payor Plan Fax Number Effective Dates    [address]   1/5/2023 - None Entered    [place] [address]       Subscriber Name Subscriber Birth Date Member ID       LYNNETTE SANTOS 1955 [id number]                 Emergency Contacts          No emergency contacts on file.               History & Physical "      Bib Parsons MD at 23 0303          Hospitalist History and Physical        Patient Identification  Name: Nathalia Landa  Age/Sex: 67 y.o. female  :  1955        MRN: [id number]  Visit Number: [id number]  Admit Date: 2023   PCP: Provider, No Known          Chief complaint generalized weakness, left leg swelling, redness and pain    History of Present Illness:  Patient is a 67 y.o. female with history of hypertension and hyperlipidemia who presents with complaints of worsening generalized weakness resulting in falls and inability to walk over the last week or so. In addition, she reports increased swelling, redness and pain involving her left upper leg that has developed over the last few days. She reports accompanying chills but denies fever. She denies chest pain, abdominal pain, nausea or vomiting. She reports dyspnea with exertion. She reports bilateral lower extremity swelling chronically but denies any worsening swelling in her right leg. She denies dysuria. She reports poor appetite with decreased PO intake over the last week.     Review of Systems  Review of Systems   Constitutional: Positive for activity change, appetite change, chills and fatigue. Negative for fever and unexpected weight change.   HENT: Negative for congestion, postnasal drip, rhinorrhea, sinus pressure, sinus pain and sore throat.    Eyes: Negative for photophobia, pain, discharge, redness, itching and visual disturbance.   Respiratory: Positive for shortness of breath (with exertion). Negative for cough and wheezing.    Cardiovascular: Positive for leg swelling (chronic, with acute worsening in left upper leg). Negative for chest pain and palpitations.   Gastrointestinal: Negative for abdominal distention, abdominal pain, constipation, diarrhea, nausea and vomiting.   Endocrine: Negative for cold intolerance, heat intolerance, polydipsia, polyphagia and polyuria.       History  Past Medical History:    Diagnosis Date   • Hyperlipidemia    • Hypertension      Past Surgical History:   Procedure Laterality Date   • CHOLECYSTECTOMY       Family History   Problem Relation Age of Onset   • Hypertension Mother      Social History     Tobacco Use   • Smoking status: Never   • Smokeless tobacco: Never   Substance Use Topics   • Alcohol use: Never   • Drug use: Never     No medications prior to admission.     Allergies:  Patient has no known allergies.    Objective      Vital Signs  Temp:  [96.9 °F (36.1 °C)-98.6 °F (37 °C)] 98.6 °F (37 °C)  Heart Rate:  [66-70] 70  Resp:  [18-19] 19  BP: (119-134)/(53-70) 124/56  Body mass index is 54.08 kg/m².    Physical Exam:  Physical Exam  Constitutional:       General: She is not in acute distress.     Appearance: She is obese. She is ill-appearing.   HENT:      Head: Normocephalic and atraumatic.      Right Ear: External ear normal.      Left Ear: External ear normal.      Nose: Nose normal.      Mouth/Throat:      Mouth: Mucous membranes are moist.      Pharynx: Oropharynx is clear.      Comments: Poor dentition  Eyes:      Extraocular Movements: Extraocular movements intact.      Conjunctiva/sclera: Conjunctivae normal.      Pupils: Pupils are equal, round, and reactive to light.   Cardiovascular:      Rate and Rhythm: Normal rate and regular rhythm.      Pulses: Normal pulses.      Heart sounds: Normal heart sounds. No murmur heard.  Pulmonary:      Effort: Pulmonary effort is normal. No respiratory distress.      Breath sounds: Normal breath sounds. No wheezing or rales.   Abdominal:      General: Abdomen is flat. Bowel sounds are normal. There is no distension.      Palpations: Abdomen is soft.      Tenderness: There is no abdominal tenderness.   Musculoskeletal:         General: Normal range of motion.      Cervical back: Normal range of motion and neck supple. No tenderness.      Right lower leg: Edema (1+ pitting) present.      Left lower leg: Edema (2+ pitting) present.    Lymphadenopathy:      Cervical: No cervical adenopathy.   Skin:     General: Skin is warm and dry.      Capillary Refill: Capillary refill takes less than 2 seconds.      Coloration: Skin is not jaundiced.      Findings: Erythema (chronic appearing erythema left lower leg consistent with venous stasis dermatitis. More acute erythema left upper leg, cirfumferential mainly on anterior leg but stretching around to encompass the popliteal fossa, less heel) present. No bruising.      Comments: Non-blanching erythema left sacrum/gluteal cleft. Pinpoint lesion right inferior buttock. Macerated skin left popliteal fossa. Intertrigo noted in bilateral inguinal folds.    Neurological:      General: No focal deficit present.      Mental Status: She is alert and oriented to person, place, and time.   Psychiatric:         Mood and Affect: Mood normal.         Behavior: Behavior normal.         Thought Content: Thought content normal.           Results Review:       Lab Results:  Results from last 7 days   Lab Units 01/05/23  1838   WBC 10*3/mm3 19.86*   HEMOGLOBIN g/dL 14.2   PLATELETS 10*3/mm3 429     Results from last 7 days   Lab Units 01/05/23  1838   CRP mg/dL 4.55*     Results from last 7 days   Lab Units 01/05/23  1838   SODIUM mmol/L 125*   POTASSIUM mmol/L 4.1   CHLORIDE mmol/L 84*   CO2 mmol/L 22.3   BUN mg/dL 124*   CREATININE mg/dL 2.37*   CALCIUM mg/dL 9.4   GLUCOSE mg/dL 215*         Hemoglobin A1C   Date Value Ref Range Status   01/05/2023 7.60 (H) 4.80 - 5.60 % Final     Results from last 7 days   Lab Units 01/05/23  1838   BILIRUBIN mg/dL 0.5   ALK PHOS U/L 193*   AST (SGOT) U/L 15   ALT (SGPT) U/L 21     Results from last 7 days   Lab Units 01/05/23  1838   TROPONIN T ng/mL <0.010                   I have reviewed the patient's laboratory results.    Imaging:  Imaging Results (Last 72 Hours)     Procedure Component Value Units Date/Time    XR Tibia Fibula 2 View Right [[id number]] Collected: 01/05/23 9541      Updated: 01/05/23 2230    Narrative:      CR Tibia Fibula 2 Vws RT    INDICATION:   Leg swelling.    COMPARISON:   None available.    FINDINGS:   AP and lateral views of the right tibia/fibula.     Subcutaneous soft tissue edema. No fracture or dislocation. Tricompartmental knee osteoarthrosis, most advanced in the medial compartment. Visualized portions of the ankle joints are in anatomic alignment. No foreign body.      Impression:      1.  Subcutaneous edema. No acute osseous abnormality.  2.  Tricompartmental knee osteoarthrosis.    Signer Name: Ezio Boyer MD   Signed: 1/5/2023 10:28 PM   Workstation Name: [id number]    [facility]    XR Femur 2 View Left [[id number]] Collected: 01/05/23 2227     Updated: 01/05/23 2229    Narrative:      CR Femur 2 Vws LT    INDICATION:   left leg eyrthema and pain    COMPARISON:   None available.    FINDINGS:   AP and lateral views of the left femur.      Evaluation of the proximal femur is limited due to patient body habitus. No fracture or dislocation. Tricompartmental osteoarthrosis of the knee.  No foreign body.      Impression:      1.  Evaluation of the proximal femur is limited due to patient body habitus. No acute osseous findings.  2.  Tricompartmental knee osteoarthrosis.    Signer Name: Ezio Boyer MD   Signed: 1/5/2023 10:27 PM   Workstation Name: [id number]    [facility]    XR Tibia Fibula 2 View Left [[id number]] Collected: 01/05/23 2226     Updated: 01/05/23 2228    Narrative:      CR Tibia Fibula 2 Vws LT    INDICATION:   Erythema.    COMPARISON:   None available.    FINDINGS:   AP and lateral views of the left tibia/fibula.      Subcutaneous soft tissue edema. No fracture or dislocation.  Tricompartmental osteoarthrosis, most advanced in the medial compartment. Visualized ankle joint is anatomic alignment. Plantar calcaneal spur. No foreign body.      Impression:      1.  No fracture or dislocation.  2.  Subcutaneous  soft tissue edema.  3.  Tricompartmental knee osteoarthrosis.    Signer Name: Ezio Boyer MD   Signed: 1/5/2023 10:26 PM   Workstation Name: [id number]    [facility]    XR Chest 1 View [[id number]] Collected: 01/05/23 2004     Updated: 01/05/23 2006    Narrative:      CR Chest 1 Vw    INDICATION:   Bilateral lower extremity swelling     COMPARISON:    July 7, 2007    FINDINGS:  Portable AP view(s) of the chest.    The heart size is enlarged. Mediastinal structures are midline. Pulmonary vascularity is normal. The lungs are clear. No pleural fluid. No pneumothorax.      Impression:      Cardiomegaly. No acute infiltrates.    Signer Name: Donte Mahmood MD   Signed: 1/5/2023 8:04 PM   Workstation Name: [id number]    [facility]    US Venous Doppler Lower Extremity Bilateral (duplex) [[id number]] Collected: 01/05/23 2001     Updated: 01/05/23 2003    Narrative:      US Veins LE Duplex BILAT    HISTORY:   Bilateral leg swelling and pain    TECHNIQUE:   Real-time ultrasound was performed of both lower extremities utilizing spectral and color Doppler with compression and augmentation techniques.    COMPARISON:  None available.    FINDINGS:  Right Lower Extremity:  There is no deep venous thrombus seen in the right lower extremity, including the right common femoral veins, right femoral veins and right popliteal veins.  Normal compressibility and respiratory phasicity was visualized.  No calf vein thrombus. Greater  saphenous vein is patent.    Left Lower Extremity:  There is no deep venous thrombus seen in the left lower extremity, including the left common femoral veins, left femoral veins and left popliteal veins.   Normal compressibility and respiratory phasicity was visualized.  No calf vein thrombus. Greater  saphenous vein is patent.        Impression:      No deep venous thrombus seen in either lower extremity.    Signer Name: Donte Mahmood MD   Signed: 1/5/2023 8:01  PM   Workstation Name: [id number]    [facility]    CT Head Without Contrast [[id number]] Collected: 01/05/23 2000     Updated: 01/05/23 2002    Narrative:      CT Head WO    HISTORY: weakness    COMPARISON: None.    TECHNIQUE: CT of the brain without IV contrast. Coronal and sagittal reconstructions were obtained.  Radiation dose reduction techniques included automated exposure control or exposure modulation based on body size. Count of known CT and cardiac nuc med  studies performed in previous 12 months: 0.     Time of Scan: 7:46 PM    FINDINGS:    No acute intracranial hemorrhage, mass effect, midline shift, or hydrocephalus.     Gray-white matter differentiation is within normal limits. Mild patchy hypodensities in the cerebral white matter, nonspecific but likely representing chronic microvascular ischemic changes. Right thalamic or capsular chronic lacunar infarct.    Ventricles and sulci are normal in size. Basal cisterns are clear.     Calvarium is intact.     Layering fluid in the left sphenoid sinus. Visualized mastoid air cells are clear.      Impression:        1.  No acute intracranial hemorrhage or mass effect.  2.  Chronic microvascular ischemic changes and generalized cerebral atrophy.  3.  Right thalamic or capsular chronic lacunar infarcts.  4.  Layering fluid in the left sphenoid sinus. Correlate with symptoms of sinusitis.    Signer Name: Ezio Boyer MD   Signed: 1/5/2023 8:00 PM   Workstation Name: [id number]    [facility]          I have personally reviewed the patient's radiologic imaging.        EKG:   Normal sinus rhythm, HR 66, QTc 457  Voltage criteria for left ventricular hypertrophy  Possible Lateral infarct , age undetermined  Abnormal ECG  No previous ECGs available    I have personally reviewed the patient's EKG. Q waves noted across lateral precordial leads suggesting old infarct. No overt ST changes to suggest acute ischemia  appreciated.         Assessment & Plan     - Left upper leg/thigh cellulitis, superimposed on chronic venous stasis dermatitis: treat with IV vancomycin and rocephin for now. Follow up blood culture results. Continue to monitor WBC, CRP. Consult general surgery to evaluate left popliteal fossa for possible abscess formation.  - Hyponatremia, suspect hypovolemic based on history of poor PO intake in recent days and elevated BUN:cr ratio with presumed CARLOS, though home HCTZ use is also contributing. Hold HCTZ for now. Gently hydrate with IV NS 75cc/hr. Closely monitor Na moving forward.   - Presumed CARLOS, though baseline renal function unknown: suspect due to dehydration and nephrotoxic home meds including HCTZ above. Monitor response to gentle IV fluid hydration.   - Diabetes, new diagnosis: based on A1c of 7.6. Monitor accuchecks, cover with SSI.  - Hypertension: hold HCTZ as noted above. Patient reports she takes metoprolol as well; plan to resume with hold parameters once home med list is reconciled by pharmacy.   - Hyperlipidemia: check lipid panel this morning.   - Mild lactic acidosis: already normalized following fluid bolus.   - Bilateral lower extremity edema, cardiomegaly on CXR: evaluate further with ECHO later this morning to investigate for underlying CHF.   - Morbid obesity, BMI 54, negatively impacting all aspects of care.  - Left heel/popliteal fossa maceration, intertrigo, decubitus wounds: wound care consult.     DVT Prophylaxis: diabetic/renal diet    Estimated Length of Stay >2 midnights    I discussed the patient's findings, assessment and plan with the patient and nursing staff on [facility].    * patient is high risk due to left upper leg cellulitis, hyponatremia, CARLOS, morbid obesity    Bib Parsons MD  01/06/23  03:03 EST      Electronically signed by Bib Parsons MD at 01/06/23 0025          Emergency Department Notes      Hanna Pérez PA at 01/05/23 6269                  MEDICAL SCREENING:    Reason for Visit: BLE pain and swelling; weakness     Patient initially seen in triage.  The patient was advised further evaluation and diagnostic testing will be needed, some of the treatment and testing will be initiated in the lobby in order to begin the process.  The patient will be returned to the waiting area for the time being and possibly be re-assessed by a subsequent ED provider.  The patient will be brought back to the treatment area in as timely manner as possible.       Hanna Pérez PA  01/05/23 1830      Electronically signed by Hanna Pérez PA at 01/05/23 1830     Jennifer Aleman at 01/05/23 1926        Ekg performed at 1919 given to Dr. Carpio     Electronically signed by Jennifer Aleman at 01/05/23 1927         Facility-Administered Medications as of 1/6/2023   Medication Dose Route Frequency Provider Last Rate Last Admin   • [COMPLETED] cefTRIAXone (ROCEPHIN) 1 g in sodium chloride 0.9 % 100 mL IVPB-VTB  1 g Intravenous Once Nicole Carpio MD   Stopped at 01/06/23 0145   • cefTRIAXone (ROCEPHIN) 1 g in sodium chloride 0.9 % 100 mL IVPB-VTB  1 g Intravenous Q24H Bib Parsons MD       • dextrose (D50W) (25 g/50 mL) IV injection 25 g  25 g Intravenous Q15 Min PRN Bib Parsons MD       • dextrose (GLUTOSE) oral gel 15 g  15 g Oral Q15 Min PRN Bib Parsons MD       • glucagon (human recombinant) (GLUCAGEN DIAGNOSTIC) injection 1 mg  1 mg Intramuscular Q15 Min PRN Bib Parsons MD       • heparin (porcine) 5000 UNIT/ML injection 5,000 Units  5,000 Units Subcutaneous Q12H Bib Parsons MD   5,000 Units at 01/06/23 0904   • [START ON 1/7/2023] Influenza Vac High-Dose Quad (FLUZONE HIGH DOSE) injection 0.7 mL  0.7 mL Intramuscular Once Bib Parsons MD       • Insulin Aspart (novoLOG) injection 0-7 Units  0-7 Units Subcutaneous TID AC Bib Parsons MD   2 Units at 01/06/23 0903   • [COMPLETED]  morphine injection 4 mg  4 mg Intravenous Once Nicole Carpio MD   4 mg at 01/06/23 0203   • nitroglycerin (NITROSTAT) SL tablet 0.4 mg  0.4 mg Sublingual Q5 Min PRN Bib Parsons MD       • [COMPLETED] sodium chloride 0.9 % bolus 1,000 mL  1,000 mL Intravenous Once Nicole Carpio MD 1,000 mL/hr at 01/05/23 2257 1,000 mL at 01/05/23 2257   • sodium chloride 0.9 % flush 10 mL  10 mL Intravenous Q12H Bib Parsons MD   10 mL at 01/06/23 0904   • sodium chloride 0.9 % flush 10 mL  10 mL Intravenous PRN Bib Parsons MD       • sodium chloride 0.9 % infusion 40 mL  40 mL Intravenous PRN Bib Parsons MD       • sodium chloride 0.9 % infusion  75 mL/hr Intravenous Continuous Bib Parsons MD 75 mL/hr at 01/06/23 0320 75 mL/hr at 01/06/23 0320   • [COMPLETED] vancomycin 2000 mg/500 mL 0.9% NS IVPB ([facility])  2,000 mg Intravenous Once Nicole Carpio MD   Stopped at 01/06/23 0100   • Vancomycin Pharmacy Intermittent/Pulse Dosing   Does not apply Daily Bib Parsons MD

## 2023-01-06 NOTE — THERAPY EVALUATION
Acute Care - Physical Therapy Initial Evaluation   [facility]     Patient Name: Nathalia Landa  : 1955  MRN: [id number]  Today's Date: 2023      Visit Dx:     ICD-10-CM ICD-9-CM   1. CARLOS (acute kidney injury) (HCC)  N17.9 584.9   2. Cellulitis, unspecified cellulitis site  L03.90 682.9   3. Weakness  R53.1 780.79     Patient Active Problem List   Diagnosis   • Cellulitis of lower extremity     Past Medical History:   Diagnosis Date   • Hyperlipidemia    • Hypertension      Past Surgical History:   Procedure Laterality Date   • CHOLECYSTECTOMY       PT Assessment (last 12 hours)     PT Evaluation and Treatment     Row Name 23 1300          Physical Therapy Time and Intention    Subjective Information complains of;fatigue;swelling  -KM     Document Type evaluation  -KM     Mode of Treatment individual therapy;physical therapy  -KM     Patient Effort adequate  -KM     Symptoms Noted During/After Treatment fatigue  -KM     Row Name 23 1300          General Information    Patient Profile Reviewed yes  -KM     Patient Observations alert;cooperative;agree to therapy  -KM     Prior Level of Function independent:;all household mobility;ADL's  per pt. report  -KM     Existing Precautions/Restrictions fall  -KM     Risks Reviewed patient:;LOB;nausea/vomiting;dizziness;increased discomfort  -KM     Benefits Reviewed patient:;improve function;increase independence;increase strength;increase balance  -KM     Row Name 23 1300          Living Environment    Current Living Arrangements home  -KM     People in Home grandchild(radhika)  -KM     Primary Care Provided by self  -KM     Row Name 23 1300          Home Use of Assistive/Adaptive Equipment    Equipment Currently Used at Home walker, rolling  -KM     Row Name 23 1300          Cognition    Affect/Mental Status (Cognition) WFL  -KM     Orientation Status (Cognition) oriented x 4  -KM     Follows Commands (Cognition) WFL  -KM     Row Name  01/06/23 1300          Range of Motion (ROM)    Range of Motion --  BLE ROM grossly limited d/t swelling  -KM     Row Name 01/06/23 1300          Strength (Manual Muscle Testing)    Strength (Manual Muscle Testing) --  RLE strength grossly 3+/5; LLE strength grossly 2/5  -KM     Row Name 01/06/23 1300          Bed Mobility    Bed Mobility supine-sit  -KM     Supine-Sit [place] (Bed Mobility) moderate assist (50% patient effort);verbal cues  -KM     Bed Mobility, Safety Issues decreased use of legs for bridging/pushing;decreased use of arms for pushing/pulling  -KM     Assistive Device (Bed Mobility) bed rails;head of bed elevated  -KM     Row Name 01/06/23 1300          Transfers    Transfers sit-stand transfer;stand-sit transfer;bed-chair transfer  -     Row Name 01/06/23 1300          Bed-Chair Transfer    Bed-Chair [place] (Transfers) moderate assist (50% patient effort);maximum assist (25% patient effort);verbal cues  -     Row Name 01/06/23 1300          Sit-Stand Transfer    Sit-Stand [place] (Transfers) moderate assist (50% patient effort);maximum assist (25% patient effort);verbal cues  -KM     Row Name 01/06/23 1300          Stand-Sit Transfer    Stand-Sit [place] (Transfers) minimum assist (75% patient effort);verbal cues  -KM     Row Name 01/06/23 1300          Safety Issues, Functional Mobility    Impairments Affecting Function (Mobility) balance;endurance/activity tolerance;range of motion (ROM);strength  -KM     Row Name 01/06/23 1300          Balance    Balance Assessment standing dynamic balance;sitting static balance  -KM     Static Sitting Balance supervision  -KM     Position, Sitting Balance sitting edge of bed  -KM     Dynamic Standing Balance moderate assist;maximum assist  -KM     Row Name             Wound 01/06/23 0440 Bilateral midline sacral spine Pressure Injury    Wound - Properties Group Placement Date: 01/06/23  -AA Placement Time: 0440 -AA Present on Hospital  Admission: Y  -AA Side: Bilateral  -AA Orientation: midline  -AA Location: sacral spine  -AA Primary Wound Type: Pressure inj  -AA    Retired Wound - Properties Group Placement Date: 01/06/23 -AA Placement Time: 0440  -AA Present on Hospital Admission: Y  -AA Side: Bilateral  -AA Orientation: midline  -AA Location: sacral spine  -AA Primary Wound Type: Pressure inj  -AA    Retired Wound - Properties Group Date first assessed: 01/06/23 -AA Time first assessed: 0440  -AA Present on Hospital Admission: Y  -AA Side: Bilateral  -AA Location: sacral spine  -AA Primary Wound Type: Pressure inj  -AA    Row Name             Wound 01/06/23 0442 Right medial gluteal Pressure Injury    Wound - Properties Group Placement Date: 01/06/23 -AA Placement Time: 0442  -AA Present on Hospital Admission: Y  -AA Side: Right  -AA Orientation: medial  -AA Location: gluteal  -AA Primary Wound Type: Pressure inj  -AA    Retired Wound - Properties Group Placement Date: 01/06/23 -AA Placement Time: 0442  -AA Present on Hospital Admission: Y  -AA Side: Right  -AA Orientation: medial  -AA Location: gluteal  -AA Primary Wound Type: Pressure inj  -AA    Retired Wound - Properties Group Date first assessed: 01/06/23 -AA Time first assessed: 0442  -AA Present on Hospital Admission: Y  -AA Side: Right  -AA Location: gluteal  -AA Primary Wound Type: Pressure inj  -AA    Row Name             Wound 01/06/23 0443 Left posterior heel Other (comment)    Wound - Properties Group Placement Date: 01/06/23 -AA Placement Time: 0443  -AA Present on Hospital Admission: Y  -AA Side: Left  -AA Orientation: posterior  -AA Location: heel  -AA Primary Wound Type: Other  -AA, Maceration     Retired Wound - Properties Group Placement Date: 01/06/23 -AA Placement Time: 0443  -AA Present on Hospital Admission: Y  -AA Side: Left  -AA Orientation: posterior  -AA Location: heel  -AA Primary Wound Type: Other  -AA, Maceration     Retired Wound - Properties Group Date  first assessed: 01/06/23 -AA Time first assessed: 0443  -AA Present on Hospital Admission: Y  -AA Side: Left  -AA Location: heel  -AA Primary Wound Type: Other  -AA, Maceration     Row Name             Wound 01/06/23 0444 Left lower leg Other (comment)    Wound - Properties Group Placement Date: 01/06/23 -AA Placement Time: 0444  -AA Present on Hospital Admission: Y  -AA Side: Left  -AA Orientation: lower  -AA Location: leg  -AA Primary Wound Type: Other  -AA, cellulitus     Retired Wound - Properties Group Placement Date: 01/06/23 -AA Placement Time: 0444  -AA Present on Hospital Admission: Y  -AA Side: Left  -AA Orientation: lower  -AA Location: leg  -AA Primary Wound Type: Other  -AA, cellulitus     Retired Wound - Properties Group Date first assessed: 01/06/23 -AA Time first assessed: 0444  -AA Present on Hospital Admission: Y  -AA Side: Left  -AA Location: leg  -AA Primary Wound Type: Other  -AA, cellulitus     Row Name             Wound 01/06/23 0445 Left popliteal Abcess    Wound - Properties Group Placement Date: 01/06/23 -AA Placement Time: 0445  -AA Present on Hospital Admission: Y  -AA Side: Left  -AA Location: popliteal  -AA Primary Wound Type: Abcess  -AA    Retired Wound - Properties Group Placement Date: 01/06/23 -AA Placement Time: 0445  -AA Present on Hospital Admission: Y  -AA Side: Left  -AA Location: popliteal  -AA Primary Wound Type: Abcess  -AA    Retired Wound - Properties Group Date first assessed: 01/06/23 -AA Time first assessed: 0445  -AA Present on Hospital Admission: Y  -AA Side: Left  -AA Location: popliteal  -AA Primary Wound Type: Abcess  -AA    Row Name 01/06/23 1300          Plan of Care Review    Plan of Care Reviewed With patient  -KM     Outcome Evaluation Pt. evaluation completed during PT session. She was able to perform functional mobility skills w/ maxA. She was not able to ambulate d/t weakness and safety. Pt. would benefit from skilled PT services. Anticipated  discharge to inpatient rehab vs SNF for rehab.  -     Row Name 01/06/23 1300          Therapy Assessment/Plan (PT)    Patient/Family Therapy Goals Statement (PT) return home at PLOF  -KM     Functional Level at Time of Evaluation (PT) maxA  -KM     PT Diagnosis (PT) lower extremity weakness  -KM     Rehab Potential (PT) fair, will monitor progress closely  -KM     Criteria for Skilled Interventions Met (PT) yes;skilled treatment is necessary  -KM     Therapy Frequency (PT) 2 times/wk  2-5x/wk  -KM     Predicted Duration of Therapy Intervention (PT) until discharge  -KM     Problem List (PT) problems related to;balance;mobility;range of motion (ROM);strength;postural control  -KM     Activity Limitations Related to Problem List (PT) unable to ambulate safely;unable to transfer safely  -     Row Name 01/06/23 1300          Therapy Plan Review/Discharge Plan (PT)    Therapy Plan Review (PT) evaluation/treatment results reviewed;care plan/treatment goals reviewed;risks/benefits reviewed;patient  -KM     Row Name 01/06/23 1300          Physical Therapy Goals    Bed Mobility Goal Selection (PT) bed mobility, PT goal 1  -KM     Transfer Goal Selection (PT) transfer, PT goal 1  -KM     Gait Training Goal Selection (PT) gait training, PT goal 1  -KM     Row Name 01/06/23 1300          Bed Mobility Goal 1 (PT)    Activity/Assistive Device (Bed Mobility Goal 1, PT) bed mobility activities, all  -KM     [place] Level/Cues Needed (Bed Mobility Goal 1, PT) contact guard required  -KM     Time Frame (Bed Mobility Goal 1, PT) by discharge  -     Row Name 01/06/23 1300          Transfer Goal 1 (PT)    Activity/Assistive Device (Transfer Goal 1, PT) sit-to-stand/stand-to-sit;bed-to-chair/chair-to-bed;walker, rolling  -KM     [place] Level/Cues Needed (Transfer Goal 1, PT) contact guard required  -KM     Time Frame (Transfer Goal 1, PT) by discharge  -     Row Name 01/06/23 1300          Gait Training Goal 1 (PT)     Activity/Assistive Device (Gait Training Goal 1, PT) gait (walking locomotion);assistive device use;walker, rolling  -KM     [place] Level (Gait Training Goal 1, PT) contact guard required  -KM     Distance (Gait Training Goal 1, PT) 30'  -KM     Time Frame (Gait Training Goal 1, PT) by discharge  -KM           User Key  (r) = Recorded By, (t) = Taken By, (c) = Cosigned By    Initials Name Provider Type    Mendy Arzola, RN Registered Nurse    Jordy Torres, JOSE ENRIQUE Physical Therapist                Physical Therapy Education     Title: PT OT SLP Therapies (Done)     Topic: Physical Therapy (Done)     Point: Mobility training (Done)     Learning Progress Summary           Patient Acceptance, E,TB, VU by  at 1/6/2023 1411                   Point: Home exercise program (Done)     Learning Progress Summary           Patient Acceptance, E,TB, VU by  at 1/6/2023 1411                   Point: Body mechanics (Done)     Learning Progress Summary           Patient Acceptance, E,TB, VU by  at 1/6/2023 1411                   Point: Precautions (Done)     Learning Progress Summary           Patient Acceptance, E,TB, VU by  at 1/6/2023 1411                               User Key     Initials Effective Dates Name Provider Type Discipline     05/24/22 -  Jordy Martinez, JOSE ENRIQUE Physical Therapist PT              PT Recommendation and Plan  Anticipated Discharge Disposition (PT): home with home health, inpatient rehabilitation facility, skilled nursing facility  Planned Therapy Interventions (PT): balance training, bed mobility training, gait training, home exercise program, patient/family education, postural re-education, ROM (range of motion), strengthening, stretching, transfer training  Therapy Frequency (PT): 2 times/wk (2-5x/wk)  Plan of Care Reviewed With: patient  Outcome Evaluation: Pt. evaluation completed during PT session. She was able to perform functional mobility skills w/ maxA. She was not able to  ambulate d/t weakness and safety. Pt. would benefit from skilled PT services. Anticipated discharge to inpatient rehab vs SNF for rehab.       Time Calculation:    PT Charges     Row Name 01/06/23 1341             Time Calculation    PT Received On 01/06/23  -KM      PT Goal Re-Cert Due Date 01/20/23  -KM            User Key  (r) = Recorded By, (t) = Taken By, (c) = Cosigned By    Initials Name Provider Type     Jordy Martinez, PT Physical Therapist              Therapy Charges for Today     Code Description Service Date Service Provider Modifiers Qty    [id number] HC PT EVAL MOD COMPLEXITY 4 1/6/2023 Jordy Martinez, PT GP 1               Jordy Martinez PT  1/6/2023

## 2023-01-06 NOTE — PROGRESS NOTES
Pharmacokinetics Service Note:    Ms. Landa continues on day 2 of 5 of vancomycin for her LLE cellulitis.  Her serum Cr has improved to 1.91 mg/dL with an estimated ClCr ~ 40 mL/min.  A 14 hour post loading dose level was reported as 13.8 mg/L today.  Will redose with 1 gm ~ 1830 to maintain adequate levels.  Will continue to follow her serum Cr trend and will repeat a random level in the next 24-36 hours to guide further dosing.      Thank you.  Alma James, Pharm.D.  1/6/2023  17:21 EST

## 2023-01-06 NOTE — H&P
Hospitalist History and Physical        Patient Identification  Name: Nathalia Landa  Age/Sex: 67 y.o. female  :  1955        MRN: [id number]  Visit Number: [id number]  Admit Date: 2023   PCP: Provider, No Known          Chief complaint generalized weakness, left leg swelling, redness and pain    History of Present Illness:  Patient is a 67 y.o. female with history of hypertension and hyperlipidemia who presents with complaints of worsening generalized weakness resulting in falls and inability to walk over the last week or so. In addition, she reports increased swelling, redness and pain involving her left upper leg that has developed over the last few days. She reports accompanying chills but denies fever. She denies chest pain, abdominal pain, nausea or vomiting. She reports dyspnea with exertion. She reports bilateral lower extremity swelling chronically but denies any worsening swelling in her right leg. She denies dysuria. She reports poor appetite with decreased PO intake over the last week.     Review of Systems  Review of Systems   Constitutional: Positive for activity change, appetite change, chills and fatigue. Negative for fever and unexpected weight change.   HENT: Negative for congestion, postnasal drip, rhinorrhea, sinus pressure, sinus pain and sore throat.    Eyes: Negative for photophobia, pain, discharge, redness, itching and visual disturbance.   Respiratory: Positive for shortness of breath (with exertion). Negative for cough and wheezing.    Cardiovascular: Positive for leg swelling (chronic, with acute worsening in left upper leg). Negative for chest pain and palpitations.   Gastrointestinal: Negative for abdominal distention, abdominal pain, constipation, diarrhea, nausea and vomiting.   Endocrine: Negative for cold intolerance, heat intolerance, polydipsia, polyphagia and polyuria.       History  Past Medical History:   Diagnosis Date   • Hyperlipidemia    • Hypertension      Past  Surgical History:   Procedure Laterality Date   • CHOLECYSTECTOMY       Family History   Problem Relation Age of Onset   • Hypertension Mother      Social History     Tobacco Use   • Smoking status: Never   • Smokeless tobacco: Never   Substance Use Topics   • Alcohol use: Never   • Drug use: Never     No medications prior to admission.     Allergies:  Patient has no known allergies.    Objective     Vital Signs  Temp:  [96.9 °F (36.1 °C)-98.6 °F (37 °C)] 98.6 °F (37 °C)  Heart Rate:  [66-70] 70  Resp:  [18-19] 19  BP: (119-134)/(53-70) 124/56  Body mass index is 54.08 kg/m².    Physical Exam:  Physical Exam  Constitutional:       General: She is not in acute distress.     Appearance: She is obese. She is ill-appearing.   HENT:      Head: Normocephalic and atraumatic.      Right Ear: External ear normal.      Left Ear: External ear normal.      Nose: Nose normal.      Mouth/Throat:      Mouth: Mucous membranes are moist.      Pharynx: Oropharynx is clear.      Comments: Poor dentition  Eyes:      Extraocular Movements: Extraocular movements intact.      Conjunctiva/sclera: Conjunctivae normal.      Pupils: Pupils are equal, round, and reactive to light.   Cardiovascular:      Rate and Rhythm: Normal rate and regular rhythm.      Pulses: Normal pulses.      Heart sounds: Normal heart sounds. No murmur heard.  Pulmonary:      Effort: Pulmonary effort is normal. No respiratory distress.      Breath sounds: Normal breath sounds. No wheezing or rales.   Abdominal:      General: Abdomen is flat. Bowel sounds are normal. There is no distension.      Palpations: Abdomen is soft.      Tenderness: There is no abdominal tenderness.   Musculoskeletal:         General: Normal range of motion.      Cervical back: Normal range of motion and neck supple. No tenderness.      Right lower leg: Edema (1+ pitting) present.      Left lower leg: Edema (2+ pitting) present.   Lymphadenopathy:      Cervical: No cervical adenopathy.    Skin:     General: Skin is warm and dry.      Capillary Refill: Capillary refill takes less than 2 seconds.      Coloration: Skin is not jaundiced.      Findings: Erythema (chronic appearing erythema left lower leg consistent with venous stasis dermatitis. More acute erythema left upper leg, cirfumferential mainly on anterior leg but stretching around to encompass the popliteal fossa, less heel) present. No bruising.      Comments: Non-blanching erythema left sacrum/gluteal cleft. Pinpoint lesion right inferior buttock. Macerated skin left popliteal fossa. Intertrigo noted in bilateral inguinal folds.    Neurological:      General: No focal deficit present.      Mental Status: She is alert and oriented to person, place, and time.   Psychiatric:         Mood and Affect: Mood normal.         Behavior: Behavior normal.         Thought Content: Thought content normal.           Results Review:       Lab Results:  Results from last 7 days   Lab Units 01/05/23  1838   WBC 10*3/mm3 19.86*   HEMOGLOBIN g/dL 14.2   PLATELETS 10*3/mm3 429     Results from last 7 days   Lab Units 01/05/23  1838   CRP mg/dL 4.55*     Results from last 7 days   Lab Units 01/05/23  1838   SODIUM mmol/L 125*   POTASSIUM mmol/L 4.1   CHLORIDE mmol/L 84*   CO2 mmol/L 22.3   BUN mg/dL 124*   CREATININE mg/dL 2.37*   CALCIUM mg/dL 9.4   GLUCOSE mg/dL 215*         Hemoglobin A1C   Date Value Ref Range Status   01/05/2023 7.60 (H) 4.80 - 5.60 % Final     Results from last 7 days   Lab Units 01/05/23  1838   BILIRUBIN mg/dL 0.5   ALK PHOS U/L 193*   AST (SGOT) U/L 15   ALT (SGPT) U/L 21     Results from last 7 days   Lab Units 01/05/23  1838   TROPONIN T ng/mL <0.010                   I have reviewed the patient's laboratory results.    Imaging:  Imaging Results (Last 72 Hours)     Procedure Component Value Units Date/Time    XR Tibia Fibula 2 View Right [[id number]] Collected: 01/05/23 2228     Updated: 01/05/23 2230    Narrative:      CR Tibia Fibula  2 Vws RT    INDICATION:   Leg swelling.    COMPARISON:   None available.    FINDINGS:   AP and lateral views of the right tibia/fibula.     Subcutaneous soft tissue edema. No fracture or dislocation. Tricompartmental knee osteoarthrosis, most advanced in the medial compartment. Visualized portions of the ankle joints are in anatomic alignment. No foreign body.      Impression:      1.  Subcutaneous edema. No acute osseous abnormality.  2.  Tricompartmental knee osteoarthrosis.    Signer Name: Ezio Boyer MD   Signed: 1/5/2023 10:28 PM   Workstation Name: [id number]    [facility]    XR Femur 2 View Left [[id number]] Collected: 01/05/23 2227     Updated: 01/05/23 2229    Narrative:      CR Femur 2 Vws LT    INDICATION:   left leg eyrthema and pain    COMPARISON:   None available.    FINDINGS:   AP and lateral views of the left femur.      Evaluation of the proximal femur is limited due to patient body habitus. No fracture or dislocation. Tricompartmental osteoarthrosis of the knee.  No foreign body.      Impression:      1.  Evaluation of the proximal femur is limited due to patient body habitus. No acute osseous findings.  2.  Tricompartmental knee osteoarthrosis.    Signer Name: Ezio Boyer MD   Signed: 1/5/2023 10:27 PM   Workstation Name: [id number]    [facility]    XR Tibia Fibula 2 View Left [[id number]] Collected: 01/05/23 2226     Updated: 01/05/23 2228    Narrative:      CR Tibia Fibula 2 Vws LT    INDICATION:   Erythema.    COMPARISON:   None available.    FINDINGS:   AP and lateral views of the left tibia/fibula.      Subcutaneous soft tissue edema. No fracture or dislocation.  Tricompartmental osteoarthrosis, most advanced in the medial compartment. Visualized ankle joint is anatomic alignment. Plantar calcaneal spur. No foreign body.      Impression:      1.  No fracture or dislocation.  2.  Subcutaneous soft tissue edema.  3.  Tricompartmental knee  osteoarthrosis.    Signer Name: Ezio Boyer MD   Signed: 1/5/2023 10:26 PM   Workstation Name: [id number]    [facility]    XR Chest 1 View [[id number]] Collected: 01/05/23 2004     Updated: 01/05/23 2006    Narrative:      CR Chest 1 Vw    INDICATION:   Bilateral lower extremity swelling     COMPARISON:    July 7, 2007    FINDINGS:  Portable AP view(s) of the chest.    The heart size is enlarged. Mediastinal structures are midline. Pulmonary vascularity is normal. The lungs are clear. No pleural fluid. No pneumothorax.      Impression:      Cardiomegaly. No acute infiltrates.    Signer Name: Donte Mahmood MD   Signed: 1/5/2023 8:04 PM   [facility]: [id number]    [facility]    US Venous Doppler Lower Extremity Bilateral (duplex) [[id number]] Collected: 01/05/23 2001     Updated: 01/05/23 2003    Narrative:      US Veins LE Duplex BILAT    HISTORY:   Bilateral leg swelling and pain    TECHNIQUE:   Real-time ultrasound was performed of both lower extremities utilizing spectral and color Doppler with compression and augmentation techniques.    COMPARISON:  None available.    FINDINGS:  Right Lower Extremity:  There is no deep venous thrombus seen in the right lower extremity, including the right common femoral veins, right femoral veins and right popliteal veins.  Normal compressibility and respiratory phasicity was visualized.  No calf vein thrombus. Greater  saphenous vein is patent.    Left Lower Extremity:  There is no deep venous thrombus seen in the left lower extremity, including the left common femoral veins, left femoral veins and left popliteal veins.   Normal compressibility and respiratory phasicity was visualized.  No calf vein thrombus. Greater  saphenous vein is patent.        Impression:      No deep venous thrombus seen in either lower extremity.    Signer Name: Donte Mahmood MD   Signed: 1/5/2023 8:01 PM   Workstation Name: [id number]    [facility] [place]    CT Head Without Contrast [[id number]] Collected: 01/05/23 2000     Updated: 01/05/23 2002    Narrative:      CT Head WO    HISTORY: weakness    COMPARISON: None.    TECHNIQUE: CT of the brain without IV contrast. Coronal and sagittal reconstructions were obtained.  Radiation dose reduction techniques included automated exposure control or exposure modulation based on body size. Count of known CT and cardiac nuc med  studies performed in previous 12 months: 0.     Time of Scan: 7:46 PM    FINDINGS:    No acute intracranial hemorrhage, mass effect, midline shift, or hydrocephalus.     Gray-white matter differentiation is within normal limits. Mild patchy hypodensities in the cerebral white matter, nonspecific but likely representing chronic microvascular ischemic changes. Right thalamic or capsular chronic lacunar infarct.    Ventricles and sulci are normal in size. Basal cisterns are clear.     Calvarium is intact.     Layering fluid in the left sphenoid sinus. Visualized mastoid air cells are clear.      Impression:        1.  No acute intracranial hemorrhage or mass effect.  2.  Chronic microvascular ischemic changes and generalized cerebral atrophy.  3.  Right thalamic or capsular chronic lacunar infarcts.  4.  Layering fluid in the left sphenoid sinus. Correlate with symptoms of sinusitis.    Signer Name: Ezio Boyer MD   Signed: 1/5/2023 8:00 PM   Workstation Name: [id number]    [facility] [place]          I have personally reviewed the patient's radiologic imaging.        EKG:   Normal sinus rhythm, HR 66, QTc 457  Voltage criteria for left ventricular hypertrophy  Possible Lateral infarct , age undetermined  Abnormal ECG  No previous ECGs available    I have personally reviewed the patient's EKG. Q waves noted across lateral precordial leads suggesting old infarct. No overt ST changes to suggest acute ischemia appreciated.         Assessment & Plan     - Left upper  leg/thigh cellulitis, superimposed on chronic venous stasis dermatitis: treat with IV vancomycin and rocephin for now. Follow up blood culture results. Continue to monitor WBC, CRP. Consult general surgery to evaluate left popliteal fossa for possible abscess formation.  - Hyponatremia, suspect hypovolemic based on history of poor PO intake in recent days and elevated BUN:cr ratio with presumed CARLOS, though home HCTZ use is also contributing. Hold HCTZ for now. Gently hydrate with IV NS 75cc/hr. Closely monitor Na moving forward.   - Presumed CARLOS, though baseline renal function unknown: suspect due to dehydration and nephrotoxic home meds including HCTZ above. Monitor response to gentle IV fluid hydration.   - Diabetes, new diagnosis: based on A1c of 7.6. Monitor accuchecks, cover with SSI.  - Hypertension: hold HCTZ as noted above. Patient reports she takes metoprolol as well; plan to resume with hold parameters once home med list is reconciled by pharmacy.   - Hyperlipidemia: check lipid panel this morning.   - Mild lactic acidosis: already normalized following fluid bolus.   - Bilateral lower extremity edema, cardiomegaly on CXR: evaluate further with ECHO later this morning to investigate for underlying CHF.   - Morbid obesity, BMI 54, negatively impacting all aspects of care.  - Left heel/popliteal fossa maceration, intertrigo, decubitus wounds: wound care consult.     DVT Prophylaxis: diabetic/renal diet    Estimated Length of Stay >2 midnights    I discussed the patient's findings, assessment and plan with the patient and nursing staff on [facility].    * patient is high risk due to left upper leg cellulitis, hyponatremia, CARLOS, morbid obesity    Bib Parsons MD  01/06/23  03:03 EST

## 2023-01-06 NOTE — PROGRESS NOTES
Pharmacy was consulted to dose vancomycin for sepsis and a skin and soft tissue infection. Based on an estimated CrCl of 34mL/min, the vancomycin will be intermittently dosed based on levels following the initial 2g loading dose, to target therapeutic AUC concentrations of 400-600mg/L.hr. Thank you for the consult. Pharmacy will continue to follow and schedule doses as appropriate.     Thank you,   Merced Gaffney, PharmD  03:29 EST

## 2023-01-07 ENCOUNTER — APPOINTMENT (OUTPATIENT)
Dept: CARDIOLOGY | Facility: HOSPITAL | Age: 68
DRG: 603 | End: 2023-01-07
Payer: MEDICARE

## 2023-01-07 LAB
ANION GAP SERPL CALCULATED.3IONS-SCNC: 11.4 MMOL/L (ref 5–15)
ANION GAP SERPL CALCULATED.3IONS-SCNC: 14.2 MMOL/L (ref 5–15)
BACTERIA SPEC AEROBE CULT: NORMAL
BH CV ECHO MEAS - AO MAX PG: 10 MMHG
BH CV ECHO MEAS - AO MEAN PG: 5 MMHG
BH CV ECHO MEAS - AO ROOT DIAM: 3.2 CM
BH CV ECHO MEAS - AO V2 MAX: 158 CM/SEC
BH CV ECHO MEAS - AO V2 VTI: 25.4 CM
BH CV ECHO MEAS - EDV(CUBED): 91.1 ML
BH CV ECHO MEAS - EDV(MOD-SP4): 55.8 ML
BH CV ECHO MEAS - EF(MOD-BP): 72 %
BH CV ECHO MEAS - EF(MOD-SP4): 72.4 %
BH CV ECHO MEAS - ESV(CUBED): 50.7 ML
BH CV ECHO MEAS - ESV(MOD-SP4): 15.4 ML
BH CV ECHO MEAS - FS: 17.8 %
BH CV ECHO MEAS - IVS/LVPW: 0.81 CM
BH CV ECHO MEAS - IVSD: 1.3 CM
BH CV ECHO MEAS - LA DIMENSION: 3.4 CM
BH CV ECHO MEAS - LAT PEAK E' VEL: 10.9 CM/SEC
BH CV ECHO MEAS - LV DIASTOLIC VOL/BSA (35-75): 23.5 CM2
BH CV ECHO MEAS - LV MASS(C)D: 261.9 GRAMS
BH CV ECHO MEAS - LV SYSTOLIC VOL/BSA (12-30): 6.5 CM2
BH CV ECHO MEAS - LVIDD: 4.5 CM
BH CV ECHO MEAS - LVIDS: 3.7 CM
BH CV ECHO MEAS - LVOT AREA: 3.5 CM2
BH CV ECHO MEAS - LVOT DIAM: 2.1 CM
BH CV ECHO MEAS - LVPWD: 1.6 CM
BH CV ECHO MEAS - MED PEAK E' VEL: 8.3 CM/SEC
BH CV ECHO MEAS - MV A MAX VEL: 36.8 CM/SEC
BH CV ECHO MEAS - MV E MAX VEL: 74.1 CM/SEC
BH CV ECHO MEAS - MV E/A: 2.01
BH CV ECHO MEAS - PA ACC TIME: 0.1 SEC
BH CV ECHO MEAS - PA PR(ACCEL): 33.1 MMHG
BH CV ECHO MEAS - SI(MOD-SP4): 17 ML/M2
BH CV ECHO MEAS - SV(MOD-SP4): 40.4 ML
BH CV ECHO MEAS - TAPSE (>1.6): 2.1 CM
BH CV ECHO MEASUREMENTS AVERAGE E/E' RATIO: 7.72
BUN SERPL-MCNC: 77 MG/DL (ref 8–23)
BUN SERPL-MCNC: 89 MG/DL (ref 8–23)
BUN/CREAT SERPL: 51.7 (ref 7–25)
BUN/CREAT SERPL: 62.7 (ref 7–25)
CALCIUM SPEC-SCNC: 8.5 MG/DL (ref 8.6–10.5)
CALCIUM SPEC-SCNC: 8.7 MG/DL (ref 8.6–10.5)
CHLORIDE SERPL-SCNC: 91 MMOL/L (ref 98–107)
CHLORIDE SERPL-SCNC: 94 MMOL/L (ref 98–107)
CO2 SERPL-SCNC: 19.8 MMOL/L (ref 22–29)
CO2 SERPL-SCNC: 21.6 MMOL/L (ref 22–29)
CREAT SERPL-MCNC: 1.42 MG/DL (ref 0.57–1)
CREAT SERPL-MCNC: 1.49 MG/DL (ref 0.57–1)
DEPRECATED RDW RBC AUTO: 48.9 FL (ref 37–54)
EGFRCR SERPLBLD CKD-EPI 2021: 38.3 ML/MIN/1.73
EGFRCR SERPLBLD CKD-EPI 2021: 40.6 ML/MIN/1.73
ERYTHROCYTE [DISTWIDTH] IN BLOOD BY AUTOMATED COUNT: 15.9 % (ref 12.3–15.4)
GLUCOSE BLDC GLUCOMTR-MCNC: 138 MG/DL (ref 70–130)
GLUCOSE BLDC GLUCOMTR-MCNC: 172 MG/DL (ref 70–130)
GLUCOSE BLDC GLUCOMTR-MCNC: 182 MG/DL (ref 70–130)
GLUCOSE BLDC GLUCOMTR-MCNC: 191 MG/DL (ref 70–130)
GLUCOSE SERPL-MCNC: 137 MG/DL (ref 65–99)
GLUCOSE SERPL-MCNC: 193 MG/DL (ref 65–99)
HCT VFR BLD AUTO: 37.6 % (ref 34–46.6)
HGB BLD-MCNC: 12.8 G/DL (ref 12–15.9)
LEFT ATRIUM VOLUME INDEX: 23.1 ML/M2
MAGNESIUM SERPL-MCNC: 2.8 MG/DL (ref 1.6–2.4)
MAXIMAL PREDICTED HEART RATE: 153 BPM
MCH RBC QN AUTO: 28.8 PG (ref 26.6–33)
MCHC RBC AUTO-ENTMCNC: 34 G/DL (ref 31.5–35.7)
MCV RBC AUTO: 84.5 FL (ref 79–97)
PLATELET # BLD AUTO: 343 10*3/MM3 (ref 140–450)
PMV BLD AUTO: 9.5 FL (ref 6–12)
POTASSIUM SERPL-SCNC: 3.6 MMOL/L (ref 3.5–5.2)
POTASSIUM SERPL-SCNC: 3.8 MMOL/L (ref 3.5–5.2)
RBC # BLD AUTO: 4.45 10*6/MM3 (ref 3.77–5.28)
SODIUM SERPL-SCNC: 124 MMOL/L (ref 136–145)
SODIUM SERPL-SCNC: 128 MMOL/L (ref 136–145)
STRESS TARGET HR: 130 BPM
WBC NRBC COR # BLD: 13.53 10*3/MM3 (ref 3.4–10.8)

## 2023-01-07 PROCEDURE — 25010000002 INFLUENZA VAC HIGH-DOSE QUAD 0.7 ML SUSPENSION PREFILLED SYRINGE: Performed by: HOSPITALIST

## 2023-01-07 PROCEDURE — 82962 GLUCOSE BLOOD TEST: CPT

## 2023-01-07 PROCEDURE — 93005 ELECTROCARDIOGRAM TRACING: CPT | Performed by: HOSPITALIST

## 2023-01-07 PROCEDURE — 99232 SBSQ HOSP IP/OBS MODERATE 35: CPT | Performed by: STUDENT IN AN ORGANIZED HEALTH CARE EDUCATION/TRAINING PROGRAM

## 2023-01-07 PROCEDURE — 80048 BASIC METABOLIC PNL TOTAL CA: CPT | Performed by: STUDENT IN AN ORGANIZED HEALTH CARE EDUCATION/TRAINING PROGRAM

## 2023-01-07 PROCEDURE — 25010000002 VANCOMYCIN 5 G RECONSTITUTED SOLUTION

## 2023-01-07 PROCEDURE — 85027 COMPLETE CBC AUTOMATED: CPT | Performed by: STUDENT IN AN ORGANIZED HEALTH CARE EDUCATION/TRAINING PROGRAM

## 2023-01-07 PROCEDURE — G0008 ADMIN INFLUENZA VIRUS VAC: HCPCS | Performed by: HOSPITALIST

## 2023-01-07 PROCEDURE — 99231 SBSQ HOSP IP/OBS SF/LOW 25: CPT | Performed by: SURGERY

## 2023-01-07 PROCEDURE — 25010000002 HEPARIN (PORCINE) PER 1000 UNITS: Performed by: HOSPITALIST

## 2023-01-07 PROCEDURE — 93010 ELECTROCARDIOGRAM REPORT: CPT | Performed by: INTERNAL MEDICINE

## 2023-01-07 PROCEDURE — 93306 TTE W/DOPPLER COMPLETE: CPT

## 2023-01-07 PROCEDURE — 83735 ASSAY OF MAGNESIUM: CPT | Performed by: HOSPITALIST

## 2023-01-07 PROCEDURE — 90662 IIV NO PRSV INCREASED AG IM: CPT | Performed by: HOSPITALIST

## 2023-01-07 PROCEDURE — 25010000002 CEFTRIAXONE PER 250 MG: Performed by: HOSPITALIST

## 2023-01-07 PROCEDURE — 93306 TTE W/DOPPLER COMPLETE: CPT | Performed by: INTERNAL MEDICINE

## 2023-01-07 PROCEDURE — 63710000001 INSULIN ASPART PER 5 UNITS: Performed by: HOSPITALIST

## 2023-01-07 RX ADMIN — PRAVASTATIN SODIUM 20 MG: 40 TABLET ORAL at 21:27

## 2023-01-07 RX ADMIN — INSULIN ASPART 2 UNITS: 100 INJECTION, SOLUTION INTRAVENOUS; SUBCUTANEOUS at 17:18

## 2023-01-07 RX ADMIN — Medication 10 ML: at 08:46

## 2023-01-07 RX ADMIN — VANCOMYCIN HYDROCHLORIDE 1250 MG: 5 INJECTION, POWDER, LYOPHILIZED, FOR SOLUTION INTRAVENOUS at 17:18

## 2023-01-07 RX ADMIN — CEFTRIAXONE 1 G: 1 INJECTION, POWDER, FOR SOLUTION INTRAMUSCULAR; INTRAVENOUS at 21:28

## 2023-01-07 RX ADMIN — INSULIN ASPART 2 UNITS: 100 INJECTION, SOLUTION INTRAVENOUS; SUBCUTANEOUS at 11:45

## 2023-01-07 RX ADMIN — NYSTATIN: 100000 POWDER TOPICAL at 08:46

## 2023-01-07 RX ADMIN — PRAVASTATIN SODIUM 20 MG: 40 TABLET ORAL at 00:09

## 2023-01-07 RX ADMIN — METOPROLOL TARTRATE 50 MG: 50 TABLET, FILM COATED ORAL at 08:45

## 2023-01-07 RX ADMIN — HEPARIN SODIUM 5000 UNITS: 5000 INJECTION INTRAVENOUS; SUBCUTANEOUS at 08:45

## 2023-01-07 RX ADMIN — HEPARIN SODIUM 5000 UNITS: 5000 INJECTION INTRAVENOUS; SUBCUTANEOUS at 21:27

## 2023-01-07 RX ADMIN — AMLODIPINE BESYLATE 10 MG: 10 TABLET ORAL at 08:45

## 2023-01-07 RX ADMIN — METOPROLOL TARTRATE 50 MG: 50 TABLET, FILM COATED ORAL at 00:10

## 2023-01-07 RX ADMIN — INFLUENZA A VIRUS A/VICTORIA/2570/2019 IVR-215 (H1N1) ANTIGEN (FORMALDEHYDE INACTIVATED), INFLUENZA A VIRUS A/DARWIN/9/2021 SAN-010 (H3N2) ANTIGEN (FORMALDEHYDE INACTIVATED), INFLUENZA B VIRUS B/PHUKET/3073/2013 ANTIGEN (FORMALDEHYDE INACTIVATED), AND INFLUENZA B VIRUS B/MICHIGAN/01/2021 ANTIGEN (FORMALDEHYDE INACTIVATED) 0.7 ML: 60; 60; 60; 60 INJECTION, SUSPENSION INTRAMUSCULAR at 11:48

## 2023-01-07 RX ADMIN — NYSTATIN: 100000 POWDER TOPICAL at 21:28

## 2023-01-07 RX ADMIN — SODIUM CHLORIDE 75 ML/HR: 9 INJECTION, SOLUTION INTRAVENOUS at 22:22

## 2023-01-07 RX ADMIN — Medication 10 ML: at 21:28

## 2023-01-07 NOTE — PROGRESS NOTES
Patient seen and examined in follow up early this afternoon for lower extremity cellulitis, after midnight admission. She denied shortness of breath or leg pain at time of my exam. She appeared comfortable, respirations regular and unlabored, erythema noted of left thigh as also noted in H&P. Continue with IV antibiotics and wound care. Monitor renal function with repeat labs in AM. Full progress note to follow tomorrow.    Mariam Carpio DO  01/06/23  20:39 EST

## 2023-01-07 NOTE — CASE MANAGEMENT/SOCIAL WORK
Discharge Planning Assessment   [place]     Patient Name: Nathalia Landa  MRN: [id number]  Today's Date: 1/6/2023    Admit Date: 1/5/2023    Plan: SS received consult per ns for discharge planning.  SS spoke with pt on this date at bedside.  Pt resides at home with 16 year old Grandson and Son and Dtr in law residing next door and plans to return home at discharge.  Pt currently does not utilize home health services.  Pt requests home health at discharge.  Pt currently utilizes rolling walker,  Pt's PCP is [facility].  [facility].  Pt stated no POA or Living Will.,  Pt stated that she drove herself in private auto until recently.  SS will follow.   Discharge Needs Assessment     Row Name 01/06/23 1912       Living Environment    People in Home child(radhika), dependent    Name(s) of People in Home Lives with Grandson Jose 16 year old.  Son and Dtr in law reside next door.    Current Living Arrangements home    Primary Care Provided by self    Provides Primary Care For grandchild(radhika)    Quality of Family Relationships helpful;involved;supportive    Able to Return to Prior Arrangements yes       Resource/Environmental Concerns    Resource/Environmental Concerns none       Transition Planning    Patient/Family Anticipates Transition to home with family    Patient/Family Anticipated Services at Transition     Transportation Anticipated family or friend will provide       Discharge Needs Assessment    Equipment Currently Used at Home walker, rolling               Discharge Plan     Row Name 01/06/23 1913       Plan    Plan SS received consult per [facility] for discharge planning.  SS spoke with pt on this date at bedside.  Pt resides at home with 16 year old Grandson and Son and Dtr in law residing next door and plans to return home at discharge.  Pt currently does not utilize home health services.  Pt requests home health at discharge.  Pt currently utilizes rolling walker,  Pt's PCP is  [facility].  Pt utilizes [facility].  Pt stated no POA or Living Will.,  Pt stated that she drove herself in private auto until recently.  SS will follow.              Continued Care and Services - Admitted Since 1/5/2023    Coordination has not been started for this encounter.       Expected Discharge Date and Time     Expected Discharge Date Expected Discharge Time    Jan 9, 2023          Demographic Summary     Row Name 01/06/23 1912       General Information    Admission Type inpatient    Referral Source nursing    Reason for Consult discharge planning    Preferred Language English                      Karol Moreno, BSW

## 2023-01-07 NOTE — PROGRESS NOTES
Pharmacokinetics Service Note:     Ms. Landa is currently on day 3 of intermittently dosed vancomycin for a SSTI. Based on her improving kidney function will give 1250 mg today. If Scr stabilizes will schedule dose tomorrow. A random vancomycin level was ordered for tomorrow. Pharmacy will continue to follow and adjust dose as appropriate based on clearance and vancomycin levels.     Thank you,   Helen Sanderson, Pharm.D.   Pharmacy Resident   1/7/2023  12:03 EST

## 2023-01-07 NOTE — PLAN OF CARE
Goal Outcome Evaluation:  Plan of Care Reviewed With: patient           Outcome Evaluation: Pt resting in bed at this time. No complaints.  Will continue to follow plan of care.

## 2023-01-07 NOTE — PLAN OF CARE
Goal Outcome Evaluation:  Plan of Care Reviewed With: patient           Outcome Evaluation: Pt resting in bed or up to chair/BSC w/ x2 assist. AOx4, VSS, complaints of nausea early in the AM, pt reports relief after successful BM. Pt tolerating wound care w/ ACE wrap applied to LLE, pulses present w/ no c/o of numbness, tenderness, or tingling. IV access and telemetry monitoring patent and maintained, will continue to follow plan of care.

## 2023-01-08 LAB
ANION GAP SERPL CALCULATED.3IONS-SCNC: 11.7 MMOL/L (ref 5–15)
BUN SERPL-MCNC: 73 MG/DL (ref 8–23)
BUN/CREAT SERPL: 56.6 (ref 7–25)
CALCIUM SPEC-SCNC: 8.3 MG/DL (ref 8.6–10.5)
CHLORIDE SERPL-SCNC: 96 MMOL/L (ref 98–107)
CO2 SERPL-SCNC: 19.3 MMOL/L (ref 22–29)
CREAT SERPL-MCNC: 1.29 MG/DL (ref 0.57–1)
DEPRECATED RDW RBC AUTO: 50.6 FL (ref 37–54)
EGFRCR SERPLBLD CKD-EPI 2021: 45.6 ML/MIN/1.73
ERYTHROCYTE [DISTWIDTH] IN BLOOD BY AUTOMATED COUNT: 15.9 % (ref 12.3–15.4)
GLUCOSE BLDC GLUCOMTR-MCNC: 151 MG/DL (ref 70–130)
GLUCOSE BLDC GLUCOMTR-MCNC: 161 MG/DL (ref 70–130)
GLUCOSE BLDC GLUCOMTR-MCNC: 208 MG/DL (ref 70–130)
GLUCOSE BLDC GLUCOMTR-MCNC: 228 MG/DL (ref 70–130)
GLUCOSE SERPL-MCNC: 138 MG/DL (ref 65–99)
HCT VFR BLD AUTO: 37.5 % (ref 34–46.6)
HGB BLD-MCNC: 12.4 G/DL (ref 12–15.9)
MCH RBC QN AUTO: 28.6 PG (ref 26.6–33)
MCHC RBC AUTO-ENTMCNC: 33.1 G/DL (ref 31.5–35.7)
MCV RBC AUTO: 86.6 FL (ref 79–97)
PLATELET # BLD AUTO: 300 10*3/MM3 (ref 140–450)
PMV BLD AUTO: 9.6 FL (ref 6–12)
POTASSIUM SERPL-SCNC: 3.4 MMOL/L (ref 3.5–5.2)
POTASSIUM SERPL-SCNC: 4.2 MMOL/L (ref 3.5–5.2)
QT INTERVAL: 436 MS
QT INTERVAL: 448 MS
QTC INTERVAL: 457 MS
QTC INTERVAL: 462 MS
RBC # BLD AUTO: 4.33 10*6/MM3 (ref 3.77–5.28)
SODIUM SERPL-SCNC: 125 MMOL/L (ref 136–145)
SODIUM SERPL-SCNC: 127 MMOL/L (ref 136–145)
VANCOMYCIN SERPL-MCNC: 15.8 MCG/ML (ref 5–40)
WBC NRBC COR # BLD: 12.07 10*3/MM3 (ref 3.4–10.8)

## 2023-01-08 PROCEDURE — 25010000002 CEFTRIAXONE PER 250 MG: Performed by: HOSPITALIST

## 2023-01-08 PROCEDURE — 84443 ASSAY THYROID STIM HORMONE: CPT | Performed by: INTERNAL MEDICINE

## 2023-01-08 PROCEDURE — 84439 ASSAY OF FREE THYROXINE: CPT | Performed by: INTERNAL MEDICINE

## 2023-01-08 PROCEDURE — 80202 ASSAY OF VANCOMYCIN: CPT

## 2023-01-08 PROCEDURE — 82962 GLUCOSE BLOOD TEST: CPT

## 2023-01-08 PROCEDURE — 85027 COMPLETE CBC AUTOMATED: CPT | Performed by: STUDENT IN AN ORGANIZED HEALTH CARE EDUCATION/TRAINING PROGRAM

## 2023-01-08 PROCEDURE — 84295 ASSAY OF SERUM SODIUM: CPT | Performed by: STUDENT IN AN ORGANIZED HEALTH CARE EDUCATION/TRAINING PROGRAM

## 2023-01-08 PROCEDURE — 99232 SBSQ HOSP IP/OBS MODERATE 35: CPT | Performed by: STUDENT IN AN ORGANIZED HEALTH CARE EDUCATION/TRAINING PROGRAM

## 2023-01-08 PROCEDURE — 80048 BASIC METABOLIC PNL TOTAL CA: CPT | Performed by: STUDENT IN AN ORGANIZED HEALTH CARE EDUCATION/TRAINING PROGRAM

## 2023-01-08 PROCEDURE — 25010000002 VANCOMYCIN 5 G RECONSTITUTED SOLUTION

## 2023-01-08 PROCEDURE — 25010000002 HEPARIN (PORCINE) PER 1000 UNITS: Performed by: HOSPITALIST

## 2023-01-08 PROCEDURE — 84132 ASSAY OF SERUM POTASSIUM: CPT | Performed by: STUDENT IN AN ORGANIZED HEALTH CARE EDUCATION/TRAINING PROGRAM

## 2023-01-08 PROCEDURE — 63710000001 INSULIN ASPART PER 5 UNITS: Performed by: HOSPITALIST

## 2023-01-08 RX ORDER — CHOLECALCIFEROL (VITAMIN D3) 125 MCG
10 CAPSULE ORAL NIGHTLY PRN
Status: DISCONTINUED | OUTPATIENT
Start: 2023-01-08 | End: 2023-01-10 | Stop reason: HOSPADM

## 2023-01-08 RX ORDER — POTASSIUM CHLORIDE 1.5 G/1.77G
40 POWDER, FOR SOLUTION ORAL ONCE
Status: COMPLETED | OUTPATIENT
Start: 2023-01-08 | End: 2023-01-08

## 2023-01-08 RX ORDER — POTASSIUM CHLORIDE 20 MEQ/1
40 TABLET, EXTENDED RELEASE ORAL ONCE
Status: DISCONTINUED | OUTPATIENT
Start: 2023-01-08 | End: 2023-01-08

## 2023-01-08 RX ORDER — ACETAMINOPHEN 325 MG/1
650 TABLET ORAL EVERY 6 HOURS PRN
Status: DISCONTINUED | OUTPATIENT
Start: 2023-01-08 | End: 2023-01-10 | Stop reason: HOSPADM

## 2023-01-08 RX ADMIN — Medication 10 ML: at 20:59

## 2023-01-08 RX ADMIN — PRAVASTATIN SODIUM 20 MG: 40 TABLET ORAL at 20:59

## 2023-01-08 RX ADMIN — ACETAMINOPHEN 650 MG: 325 TABLET ORAL at 03:02

## 2023-01-08 RX ADMIN — CEFTRIAXONE 1 G: 1 INJECTION, POWDER, FOR SOLUTION INTRAMUSCULAR; INTRAVENOUS at 21:00

## 2023-01-08 RX ADMIN — Medication 10 ML: at 09:18

## 2023-01-08 RX ADMIN — POTASSIUM CHLORIDE 40 MEQ: 1.5 POWDER, FOR SOLUTION ORAL at 14:04

## 2023-01-08 RX ADMIN — METOPROLOL TARTRATE 50 MG: 50 TABLET, FILM COATED ORAL at 09:18

## 2023-01-08 RX ADMIN — NYSTATIN: 100000 POWDER TOPICAL at 09:18

## 2023-01-08 RX ADMIN — INSULIN ASPART 3 UNITS: 100 INJECTION, SOLUTION INTRAVENOUS; SUBCUTANEOUS at 11:49

## 2023-01-08 RX ADMIN — METOPROLOL TARTRATE 50 MG: 50 TABLET, FILM COATED ORAL at 20:59

## 2023-01-08 RX ADMIN — INSULIN ASPART 2 UNITS: 100 INJECTION, SOLUTION INTRAVENOUS; SUBCUTANEOUS at 17:37

## 2023-01-08 RX ADMIN — HEPARIN SODIUM 5000 UNITS: 5000 INJECTION INTRAVENOUS; SUBCUTANEOUS at 21:00

## 2023-01-08 RX ADMIN — Medication 10 MG: at 20:59

## 2023-01-08 RX ADMIN — AMLODIPINE BESYLATE 10 MG: 10 TABLET ORAL at 09:17

## 2023-01-08 RX ADMIN — Medication 1500 MG: at 17:37

## 2023-01-08 RX ADMIN — HEPARIN SODIUM 5000 UNITS: 5000 INJECTION INTRAVENOUS; SUBCUTANEOUS at 09:17

## 2023-01-08 RX ADMIN — INSULIN ASPART 2 UNITS: 100 INJECTION, SOLUTION INTRAVENOUS; SUBCUTANEOUS at 09:18

## 2023-01-08 RX ADMIN — Medication 10 MG: at 03:02

## 2023-01-08 NOTE — PLAN OF CARE
Goal Outcome Evaluation:      Patient has been up to chair 3x this shift. Wound care completed per order. Lying in bed with no S&S of distress. No complaints at this time. Will continue to monitor and follow plan of care.

## 2023-01-08 NOTE — PROGRESS NOTES
Pharmacokinetics Service Note:     Ms. Landa is currently on day 4 of intermittently dose vancomycin for a SSTI. Her CrCl has increased to 60.3 mL/min. Based on his improving kidney function will give a one time dose of 1500 mg today. Pharmacy will continue to follow and adjust dose as appropriate based on clearance and vancomycin levels.     Thank you,  Helen Sanderson, Pharm.D.  01/08/23   11:06 EST

## 2023-01-08 NOTE — PROGRESS NOTES
[facility] HOSPITALIST PROGRESS NOTE     Patient Identification:  Name:  Nathalia Landa  Age:  67 y.o.  Sex:  female  :  1955  MRN:  [id number]  Visit Number:  [id number]  ROOM: [address] [place] [address]     Primary Care Provider:  Provider, No Known    Length of stay in inpatient status:  1    Subjective     Chief Compliant:    Chief Complaint   Patient presents with   • Weakness - Generalized   • Leg Swelling       History of Presenting Illness:    Patient seen and examined early this afternoon. She denies chest pain, shortness of breath, or leg pain at time of my exam. No needs at this time.    Objective     Current Hospital Meds:amLODIPine, 10 mg, Oral, Daily  cefTRIAXone, 1 g, Intravenous, Q24H  heparin (porcine), 5,000 Units, Subcutaneous, Q12H  Insulin Aspart, 0-7 Units, Subcutaneous, TID AC  metoprolol tartrate, 50 mg, Oral, BID  nystatin, , Topical, Q12H  pravastatin, 20 mg, Oral, Nightly  sodium chloride, 10 mL, Intravenous, Q12H  Vancomycin Pharmacy Intermittent/Pulse Dosing, , Does not apply, Daily    sodium chloride, 75 mL/hr, Last Rate: 75 mL/hr (23)        Current Antimicrobial Therapy:  Anti-Infectives (From admission, onward)    Ordered     Dose/Rate Route Frequency Start Stop    23 1158  vancomycin 1250 mg/250 mL 0.9% NS IVPB ([facility])        Ordering Provider: Helen Sanderson RPH    1,250 mg  over 90 Minutes Intravenous Once 23 1800 23 1848    23 0308  cefTRIAXone (ROCEPHIN) 1 g in sodium chloride 0.9 % 100 mL IVPB-VTB        Ordering Provider: Bib Parsons MD    1 g  200 mL/hr over 30 Minutes Intravenous Every 24 Hours 23 2200 23 2159    23 1718  vancomycin (VANCOCIN) 1,000 mg in sodium chloride 0.9 % 250 mL IVPB        Ordering Provider: Bib Parsons MD    1,000 mg  over 60 Minutes Intravenous Once 23 1830 23 1918    23 0324  Vancomycin Pharmacy Intermittent/Pulse Dosing        Ordering Provider: Erika  Alma BLANCO PharmD     Does not apply Daily 01/06/23 0900 01/10/23 0859    01/05/23 2250  cefTRIAXone (ROCEPHIN) 1 g in sodium chloride 0.9 % 100 mL IVPB-VTB        Ordering Provider: Nicole Carpio MD    1 g  200 mL/hr over 30 Minutes Intravenous Once 01/05/23 2252 01/06/23 0145    01/05/23 2241  vancomycin 2000 mg/500 mL 0.9% NS IVPB ([facility])        Ordering Provider: Nicole Carpio MD    2,000 mg  over 120 Minutes Intravenous Once 01/05/23 2243 01/06/23 0100        Current Diuretic Therapy:  Diuretics (From admission, onward)    None        ----------------------------------------------------------------------------------------------------------------------  Vital Signs:  Temp:  [97.4 °F (36.3 °C)-98.5 °F (36.9 °C)] 98.5 °F (36.9 °C)  Heart Rate:  [60-74] 74  Resp:  [18-20] 20  BP: (109-151)/(55-75) 149/57  SpO2:  [95 %-97 %] 97 %  on   ;   Device (Oxygen Therapy): room air  Body mass index is 51.1 kg/m².    Wt Readings from Last 3 Encounters:   01/07/23 (!) 139 kg (307 lb 1.6 oz)     Intake & Output (last 3 days)       01/05 0701 01/06 0700 01/06 0701 01/07 0700 01/07 0701 01/08 0700    P.O.  540 600    Total Intake(mL/kg)  540 (3.9) 600 (4.3)    Urine (mL/kg/hr)  1150 (0.3)     Total Output  1150     Net  -610 +600           Urine Unmeasured Occurrence   2 x    Stool Unmeasured Occurrence   1 x        Diet: Renal Diets, Diabetic Diets; Consistent Carbohydrate; Low Sodium (2-3g), Low Potassium, Low Phosphorus; Texture: Regular Texture (IDDSI 7); Fluid Consistency: Thin (IDDSI 0)  ----------------------------------------------------------------------------------------------------------------------  Physical exam:   Constitutional:  Well-developed, chronically ill appearing.  No acute distress.      HENT:  Head:  Normocephalic and atraumatic.    Cardiovascular:  Normal rate, regular rhythm, distant heart sounds but likely due to body habitus  Pulmonary/Chest:  No respiratory distress, no wheezes, no  crackles, with normal breath sounds and good air movement.  Neurological:  Awake, alert, no focal deficit on gross examination. No slurred speech or facial droop.     Skin:  Skin is warm and dry. Skin on bilateral feet is very dry. Erythema of left thigh appears to be improving.  Peripheral vascular: Changes consistent with venous stasis and lymphedema bilaterally  Psychiatric: Appropriate mood and affect  Edited by: Mariam Carpio DO at 1/7/2023 1921  ----------------------------------------------------------------------------------------------------------------------  Results from last 7 days   Lab Units 01/07/23 0105 01/06/23 0453 01/06/23 0201 01/05/23 2253 01/05/23  1838   CRP mg/dL  --  3.49*  --   --  4.55*   LACTATE mmol/L  --   --  1.6 2.2*  --    WBC 10*3/mm3 13.53* 19.44*  --   --  19.86*   HEMOGLOBIN g/dL 12.8 13.3  --   --  14.2   HEMATOCRIT % 37.6 38.8  --   --  41.4   MCV fL 84.5 83.4  --   --  83.1   MCHC g/dL 34.0 34.3  --   --  34.3   PLATELETS 10*3/mm3 343 373  --   --  429         Results from last 7 days   Lab Units 01/07/23 0105 01/06/23 0453 01/05/23  1838   SODIUM mmol/L 128* 120* 125*   POTASSIUM mmol/L 3.8 4.0 4.1   MAGNESIUM mg/dL 2.8*  --   --    CHLORIDE mmol/L 94* 85* 84*   CO2 mmol/L 19.8* 16.4* 22.3   BUN mg/dL 89* 111* 124*   CREATININE mg/dL 1.42* 1.91* 2.37*   CALCIUM mg/dL 8.5* 8.9 9.4   GLUCOSE mg/dL 137* 164* 215*   ALBUMIN g/dL  --  2.8* 3.4*   BILIRUBIN mg/dL  --  0.4 0.5   ALK PHOS U/L  --  163* 193*   AST (SGOT) U/L  --  20 15   ALT (SGPT) U/L  --  21 21   Estimated Creatinine Clearance: 54.5 mL/min (A) (by C-G formula based on SCr of 1.42 mg/dL (H)).  No results found for: AMMONIA  Results from last 7 days   Lab Units 01/05/23  1838   TROPONIN T ng/mL <0.010     Results from last 7 days   Lab Units 01/05/23  1838   PROBNP pg/mL 164.0     Results from last 7 days   Lab Units 01/06/23  0453   CHOLESTEROL mg/dL 214*   TRIGLYCERIDES mg/dL 165*   HDL CHOL mg/dL 36*   LDL  CHOL mg/dL 148*     Hemoglobin A1C   Date/Time Value Ref Range Status   01/05/2023 1838 7.60 (H) 4.80 - 5.60 % Final     Glucose   Date/Time Value Ref Range Status   01/07/2023 1857 182 (H) 70 - 130 mg/dL Final     Comment:     RN Notified Meter: [id number] : [id number] CHANCE NOBLEKINS   01/07/2023 1656 172 (H) 70 - 130 mg/dL Final     Comment:     Meter: [id number] : [id number] keiry yesica   01/07/2023 1029 191 (H) 70 - 130 mg/dL Final     Comment:     Meter: [id number] : [id number] keiry yesica   01/07/2023 0632 138 (H) 70 - 130 mg/dL Final     Comment:     Meter: [id number] : [id number] keiry yesica   01/06/2023 1951 215 (H) 70 - 130 mg/dL Final     Comment:     Meter: [id number] : [id number] LEONEL COOK   01/06/2023 1606 226 (H) 70 - 130 mg/dL Final     Comment:     Meter: [id number] : [id number] Bharati Venegas   01/06/2023 1101 182 (H) 70 - 130 mg/dL Final     Comment:     Meter: [id number] : [id number] Bharati Pabloberly   01/06/2023 0521 168 (H) 70 - 130 mg/dL Final     Comment:     RN Notified Meter: [id number] : [id number] RAJENDRA STONER     No results found for: TSH, FREET4  No results found for: PREGTESTUR, PREGSERUM, HCG, HCGQUANT  Pain Management Panel    There is no flowsheet data to display.       Brief Urine Lab Results  (Last result in the past 365 days)      Color   Clarity   Blood   Leuk Est   Nitrite   Protein   CREAT   Urine HCG        01/05/23 2218 Yellow   Cloudy   Large (3+)   Small (1+)   Negative   30 mg/dL (1+)               Blood Culture   Date Value Ref Range Status   01/05/2023 No growth at 24 hours  Preliminary   01/05/2023 No growth at 24 hours  Preliminary     Urine Culture   Date Value Ref Range Status   01/05/2023 >100,000 CFU/mL Mixed Cris Isolated  Final     No results found for: WOUNDCX  No results found for: STOOLCX  No results found for: RESPCX  No results found for: AFBCX  Results from last 7 days   Lab Units 01/06/23  0453 01/06/23  0202  01/05/23  2253 01/05/23  1838   LACTATE mmol/L  --  1.6 2.2*  --    SED RATE mm/hr  --   --   --  83*   CRP mg/dL 3.49*  --   --  4.55*       I have personally looked at the labs and they are summarized above.  ----------------------------------------------------------------------------------------------------------------------  Detailed radiology reports for the last 24 hours:  Imaging Results (Last 24 Hours)     Procedure Component Value Units Date/Time    US Renal Bilateral [[id number]] Collected: 01/07/23 1105     Updated: 01/07/23 1107    Narrative:      EXAMINATION: US RENAL BILATERAL-      CLINICAL INDICATION: look for evidence of CKD; N17.9-Acute kidney  failure, unspecified; L03.90-Cellulitis, unspecified; R53.1-Weakness        COMPARISON: None available     PROCEDURE: Sonographic imaging of the kidneys     FINDINGS:  Imaging of the right kidney demonstrates the right kidney measuring  10.54 x 4.69 cm. No solid mass or hydronephrosis.     Left kidney measures 10.61 x 4.61 cm. No solid mass or hydronephrosis       Impression:      1. Unremarkable sonographic appearance of the kidneys.     This report was finalized on 1/7/2023 11:05 AM by Dr. Larwence Cortez MD.           Assessment & Plan      #Left upper leg and thigh cellulitis superimposed on chronic venous stasis dermatitis  #Presumed CARLOS, unknown baseline creatinine  #Hyponatremia, suspect hypovolemic  #Mild lactic acidosis  #Left heel/popliteal fossa maceration, intertrigo, decubitus wounds  - Continue vancomycin and zosyn for now. Blood cultures show no growth at 24 hours. No evidence of abscess per General Surgery.  - Wound care and General Surgery are following, appreciate assistance.  - Creatinine has trended down from 2.37 to 1.42.  - Elevated lactic acid resolved.  - Sodium initially decreased from 125 to 120, but then increased to 128 after IV fluids. Repeat BMP this evening to monitor. Currently on gentle IVF with normal saline at 75cc/hr.  - CBC,  BMP in AM to monitor leukocytosis and renal function while receiving IV antibiotics  - PT/OT consulted for assistance with mobility and discharge recommendations    #Tachycardia, resolved  - Resolved after she received her home metoprolol  - EKG interpreted by computer as junctional rhythm with inferior infarct, but it looks very similar to her previous to me. No report of chest pain. Troponin normal. Continue to monitor with telemetry. Repeat EKG and troponin prn.    #Diabetes type II, new diagnosis  - HgbA1c 7.6  - Continue accuchecks, sliding scale insulin, hypoglycemia protocol    #Hyperlipidemia  #Hypertension  - Continue statin, metoprolol. Holding HCTZ due to presumed CARLOS.    #Bilateral lower extremity edema  #Cardiomegaly on CXR  - TTE showed normal LV systolic function, EF >70%. Mild to moderate concentric hypertrophy of LV wall thickness noted.    #Morbid obesity BMI 54  - Affects all aspects of care      Edited by: Mariam Carpio DO at 1/7/2023 1918    VTE Prophylaxis:   Mechanical Order History:     None      Pharmalogical Order History:      Ordered     Dose Route Frequency Stop    01/06/23 0308  heparin (porcine) 5000 UNIT/ML injection 5,000 Units         5,000 Units SC Every 12 Hours Scheduled --                Dispo: pending PT evaluation and clinical course    Mariam Carpoi DO  [facility]  01/07/23  19:21 EST

## 2023-01-08 NOTE — PROGRESS NOTES
[facility] HOSPITALIST PROGRESS NOTE     Patient Identification:  Name:  Nathalia Landa  Age:  67 y.o.  Sex:  female  :  1955  MRN:  [id number]  Visit Number:  [id number]  ROOM: [address] [place] [address]     Primary Care Provider:  Provider, No Known    Length of stay in inpatient status:  2    Subjective     Chief Compliant:    Chief Complaint   Patient presents with   • Weakness - Generalized   • Leg Swelling       History of Presenting Illness:    Patient seen and examined this morning with nursing staff present. She says she doesn't feel well because she couldn't sleep last night, but has no other complaints. Denies chest pain, shortness of breath, or leg pain at time of my exam.     Objective     Current Hospital Meds:amLODIPine, 10 mg, Oral, Daily  cefTRIAXone, 1 g, Intravenous, Q24H  heparin (porcine), 5,000 Units, Subcutaneous, Q12H  Insulin Aspart, 0-7 Units, Subcutaneous, TID AC  metoprolol tartrate, 50 mg, Oral, BID  nystatin, , Topical, Q12H  pravastatin, 20 mg, Oral, Nightly  sodium chloride, 10 mL, Intravenous, Q12H  vancomycin, 1,500 mg, Intravenous, Once  Vancomycin Pharmacy Intermittent/Pulse Dosing, , Does not apply, Daily    sodium chloride, 75 mL/hr, Last Rate: 75 mL/hr (23)        Current Antimicrobial Therapy:  Anti-Infectives (From admission, onward)    Ordered     Dose/Rate Route Frequency Start Stop    23 1105  vancomycin 1500 mg/500 mL 0.9% NS IVPB ([facility])        Ordering Provider: Helen Sanderson RPH    1,500 mg  over 120 Minutes Intravenous Once 23 1800      23 1158  vancomycin 1250 mg/250 mL 0.9% NS IVPB ([facility])        Ordering Provider: Helen Sanderson RPH    1,250 mg  over 90 Minutes Intravenous Once 23 1800 23 1848    23 0308  cefTRIAXone (ROCEPHIN) 1 g in sodium chloride 0.9 % 100 mL IVPB-VTB        Ordering Provider: Bib Parsons MD    1 g  200 mL/hr over 30 Minutes Intravenous Every 24 Hours 23 2200 23 7728     01/06/23 1718  vancomycin (VANCOCIN) 1,000 mg in sodium chloride 0.9 % 250 mL IVPB        Ordering Provider: Bib Parsons MD    1,000 mg  over 60 Minutes Intravenous Once 01/06/23 1830 01/06/23 1918    01/06/23 0324  Vancomycin Pharmacy Intermittent/Pulse Dosing        Ordering Provider: Alma James, PharmD     Does not apply Daily 01/06/23 0900 01/10/23 0859    01/05/23 2250  cefTRIAXone (ROCEPHIN) 1 g in sodium chloride 0.9 % 100 mL IVPB-VTB        Ordering Provider: Nicole Carpio MD    1 g  200 mL/hr over 30 Minutes Intravenous Once 01/05/23 2252 01/06/23 0145    01/05/23 2241  vancomycin 2000 mg/500 mL 0.9% NS IVPB ([facility])        Ordering Provider: Nicole Carpio MD    2,000 mg  over 120 Minutes Intravenous Once 01/05/23 2243 01/06/23 0100        Current Diuretic Therapy:  Diuretics (From admission, onward)    None        ----------------------------------------------------------------------------------------------------------------------  Vital Signs:  Temp:  [98 °F (36.7 °C)-98.5 °F (36.9 °C)] 98.2 °F (36.8 °C)  Heart Rate:  [61-80] 80  Resp:  [18-20] 18  BP: (106-149)/(50-63) 122/63  SpO2:  [96 %-97 %] 97 %  on   ;   Device (Oxygen Therapy): room air  Body mass index is 51.24 kg/m².    Wt Readings from Last 3 Encounters:   01/08/23 (!) 140 kg (307 lb 14.4 oz)     Intake & Output (last 3 days)       01/05 0701 01/06 0700 01/06 0701 01/07 0700 01/07 0701 01/08 0700 01/08 0701 01/09 0700    P.O.  540 600 600    Total Intake(mL/kg)  540 (3.9) 600 (4.3) 600 (4.3)    Urine (mL/kg/hr)  1150 (0.3) 2550 (0.8)     Stool   0     Total Output  1150 2550     Net  -610 -1950 +600            Urine Unmeasured Occurrence   3 x 3 x    Stool Unmeasured Occurrence   1 x         Diet: Renal Diets, Diabetic Diets; Consistent Carbohydrate; Low Sodium (2-3g), Low Potassium, Low Phosphorus; Texture: Regular Texture (IDDSI 7); Fluid Consistency: Thin (IDDSI  0)  ----------------------------------------------------------------------------------------------------------------------  Physical exam:   Constitutional:  Well-developed, chronically ill appearing.  No acute distress.      HENT:  Head:  Normocephalic and atraumatic.    Cardiovascular:  Normal rate, regular rhythm  Pulmonary/Chest:  No respiratory distress, no wheezes, no crackles, with normal breath sounds and good air movement.  Neurological:  Awake, alert, no focal deficit on gross examination. No slurred speech or facial droop.     Skin:  Skin is warm and dry. Skin on bilateral feet is very dry. Erythema of left thigh is again slightly improved.  Peripheral vascular: Changes consistent with venous stasis and lymphedema bilaterally  Psychiatric: Appropriate mood and affect  Edited by: Mariam Carpio DO at 1/8/2023 1559  ----------------------------------------------------------------------------------------------------------------------  Results from last 7 days   Lab Units 01/08/23  0240 01/07/23  0105 01/06/23  0453 01/06/23  0201 01/05/23  7563 01/05/23  1364   CRP mg/dL  --   --  3.49*  --   --  4.55*   LACTATE mmol/L  --   --   --  1.6 2.2*  --    WBC 10*3/mm3 12.07* 13.53* 19.44*  --   --  19.86*   HEMOGLOBIN g/dL 12.4 12.8 13.3  --   --  14.2   HEMATOCRIT % 37.5 37.6 38.8  --   --  41.4   MCV fL 86.6 84.5 83.4  --   --  83.1   MCHC g/dL 33.1 34.0 34.3  --   --  34.3   PLATELETS 10*3/mm3 300 343 373  --   --  429         Results from last 7 days   Lab Units 01/08/23  0240 01/07/23  1907 01/07/23  0105 01/06/23  0453 01/05/23  1838   SODIUM mmol/L 127* 124* 128* 120* 125*   POTASSIUM mmol/L 3.4* 3.6 3.8 4.0 4.1   MAGNESIUM mg/dL  --   --  2.8*  --   --    CHLORIDE mmol/L 96* 91* 94* 85* 84*   CO2 mmol/L 19.3* 21.6* 19.8* 16.4* 22.3   BUN mg/dL 73* 77* 89* 111* 124*   CREATININE mg/dL 1.29* 1.49* 1.42* 1.91* 2.37*   CALCIUM mg/dL 8.3* 8.7 8.5* 8.9 9.4   GLUCOSE mg/dL 138* 193* 137* 164* 215*   ALBUMIN g/dL   --   --   --  2.8* 3.4*   BILIRUBIN mg/dL  --   --   --  0.4 0.5   ALK PHOS U/L  --   --   --  163* 193*   AST (SGOT) U/L  --   --   --  20 15   ALT (SGPT) U/L  --   --   --  21 21   Estimated Creatinine Clearance: 60.3 mL/min (A) (by C-G formula based on SCr of 1.29 mg/dL (H)).  No results found for: AMMONIA  Results from last 7 days   Lab Units 01/05/23  1838   TROPONIN T ng/mL <0.010     Results from last 7 days   Lab Units 01/05/23  1838   PROBNP pg/mL 164.0     Results from last 7 days   Lab Units 01/06/23  0453   CHOLESTEROL mg/dL 214*   TRIGLYCERIDES mg/dL 165*   HDL CHOL mg/dL 36*   LDL CHOL mg/dL 148*     Hemoglobin A1C   Date/Time Value Ref Range Status   01/05/2023 1838 7.60 (H) 4.80 - 5.60 % Final     Glucose   Date/Time Value Ref Range Status   01/08/2023 1035 228 (H) 70 - 130 mg/dL Final     Comment:     Meter: [id number] : [id number] keiry yesica   01/08/2023 0637 151 (H) 70 - 130 mg/dL Final     Comment:     Meter: [id number] : [id number] keiry yesica   01/07/2023 1857 182 (H) 70 - 130 mg/dL Final     Comment:     RN Notified Meter: [id number] : [id number] CHANCE GAITAN   01/07/2023 1656 172 (H) 70 - 130 mg/dL Final     Comment:     Meter: [id number] : [id number] keiry yesica   01/07/2023 1029 191 (H) 70 - 130 mg/dL Final     Comment:     Meter: [id number] : [id number] keiry yesica   01/07/2023 0632 138 (H) 70 - 130 mg/dL Final     Comment:     Meter: [id number] : [id number] keiry yesica   01/06/2023 1951 215 (H) 70 - 130 mg/dL Final     Comment:     Meter: [id number] : [id number] LEONEL LA   01/06/2023 1606 226 (H) 70 - 130 mg/dL Final     Comment:     Meter: [id number] : [id number] Bharati Venegas     No results found for: TSH, FREET4  No results found for: PREGTESTUR, PREGSERUM, HCG, HCGQUANT  Pain Management Panel    There is no flowsheet data to display.       Brief Urine Lab Results  (Last result in the past 365 days)      Color   Clarity   Blood   Leuk  Est   Nitrite   Protein   CREAT   Urine HCG        01/05/23 2218 Yellow   Cloudy   Large (3+)   Small (1+)   Negative   30 mg/dL (1+)               Blood Culture   Date Value Ref Range Status   01/05/2023 No growth at 2 days  Preliminary   01/05/2023 No growth at 2 days  Preliminary     Urine Culture   Date Value Ref Range Status   01/05/2023 >100,000 CFU/mL Mixed Cris Isolated  Final     No results found for: WOUNDCX  No results found for: STOOLCX  No results found for: RESPCX  No results found for: AFBCX  Results from last 7 days   Lab Units 01/06/23  0453 01/06/23  0201 01/05/23  2253 01/05/23  1838   LACTATE mmol/L  --  1.6 2.2*  --    SED RATE mm/hr  --   --   --  83*   CRP mg/dL 3.49*  --   --  4.55*       I have personally looked at the labs and they are summarized above.  ----------------------------------------------------------------------------------------------------------------------  Detailed radiology reports for the last 24 hours:  Imaging Results (Last 24 Hours)     ** No results found for the last 24 hours. **        Assessment & Plan      #Left upper leg and thigh cellulitis superimposed on chronic venous stasis dermatitis  #Presumed CARLOS, unknown baseline creatinine  #Hyponatremia, suspect hypovolemic  #Mild lactic acidosis  #Left heel/popliteal fossa maceration, intertrigo, decubitus wounds  - Continue vancomycin and zosyn for now. Blood cultures show no growth at 2 days. No evidence of abscess per General Surgery.  - Wound care and General Surgery are following, appreciate assistance.  - Creatinine has trended down from 2.37 to 1.29.  - Elevated lactic acid resolved.  - Sodium has fluctuated from 120 to 128 since admission but is trending up slowly. Repeat BMP this evening to monitor. Continue for now with gentle IVF with normal saline at 75cc/hr.  - CBC, BMP in AM to monitor leukocytosis and renal function while receiving IV antibiotics    #Tachycardia, resolved  - Resolved after she received  her home metoprolol  - EKG interpreted by computer as junctional rhythm with inferior infarct, but it looks very similar to her previous to me. No report of chest pain. Troponin normal. Continue to monitor with telemetry. Repeat EKG and troponin prn.    #Diabetes type II, new diagnosis  - HgbA1c 7.6  - Continue accuchecks, sliding scale insulin, hypoglycemia protocol    #Hyperlipidemia  #Hypertension  - Continue statin, metoprolol. Holding HCTZ due to presumed CARLOS.    #Bilateral lower extremity edema  #Cardiomegaly on CXR  - TTE showed normal LV systolic function, EF >70%. Mild to moderate concentric hypertrophy of LV wall thickness noted.    #Morbid obesity BMI 54  - Affects all aspects of care      Edited by: Mariam Carpio DO at 1/8/2023 0254    VTE Prophylaxis:   Mechanical Order History:     None      Pharmalogical Order History:      Ordered     Dose Route Frequency Stop    01/06/23 0308  heparin (porcine) 5000 UNIT/ML injection 5,000 Units         5,000 Units SC Every 12 Hours Scheduled --                Dispo: pending clinical course, PT recommends inpatient rehab vs skilled nursing facility vs home with home health    Mariam Carpio DO  [facility] Hospitalist  01/08/23  15:59 EST

## 2023-01-08 NOTE — PLAN OF CARE
Goal Outcome Evaluation:   Pt has not rested well this shift, PRN medication was administered see MAR. Pt currently has NS infusing at 75 ml/hr. Pt did complain of pain this shift. PRN medication was administered, see MAR. No signs and symptoms of acute distress noted. No complaints of chest pain or SOB reported.

## 2023-01-09 ENCOUNTER — APPOINTMENT (OUTPATIENT)
Dept: ULTRASOUND IMAGING | Facility: HOSPITAL | Age: 68
DRG: 603 | End: 2023-01-09
Payer: MEDICARE

## 2023-01-09 LAB
GLUCOSE BLDC GLUCOMTR-MCNC: 131 MG/DL (ref 70–130)
GLUCOSE BLDC GLUCOMTR-MCNC: 209 MG/DL (ref 70–130)
GLUCOSE BLDC GLUCOMTR-MCNC: 241 MG/DL (ref 70–130)
OSMOLALITY SERPL: 308 MOSM/KG (ref 280–301)
SODIUM UR-SCNC: <20 MMOL/L
T4 FREE SERPL-MCNC: 1.33 NG/DL (ref 0.93–1.7)
TSH SERPL DL<=0.05 MIU/L-ACNC: 1.66 UIU/ML (ref 0.27–4.2)

## 2023-01-09 PROCEDURE — 97116 GAIT TRAINING THERAPY: CPT

## 2023-01-09 PROCEDURE — 93971 EXTREMITY STUDY: CPT

## 2023-01-09 PROCEDURE — 83930 ASSAY OF BLOOD OSMOLALITY: CPT | Performed by: INTERNAL MEDICINE

## 2023-01-09 PROCEDURE — 83935 ASSAY OF URINE OSMOLALITY: CPT | Performed by: INTERNAL MEDICINE

## 2023-01-09 PROCEDURE — 82962 GLUCOSE BLOOD TEST: CPT

## 2023-01-09 PROCEDURE — 84300 ASSAY OF URINE SODIUM: CPT | Performed by: INTERNAL MEDICINE

## 2023-01-09 PROCEDURE — 93971 EXTREMITY STUDY: CPT | Performed by: RADIOLOGY

## 2023-01-09 PROCEDURE — 25010000002 HEPARIN (PORCINE) PER 1000 UNITS: Performed by: HOSPITALIST

## 2023-01-09 PROCEDURE — 97110 THERAPEUTIC EXERCISES: CPT

## 2023-01-09 PROCEDURE — 63710000001 INSULIN ASPART PER 5 UNITS: Performed by: HOSPITALIST

## 2023-01-09 PROCEDURE — 94761 N-INVAS EAR/PLS OXIMETRY MLT: CPT

## 2023-01-09 PROCEDURE — 99232 SBSQ HOSP IP/OBS MODERATE 35: CPT | Performed by: INTERNAL MEDICINE

## 2023-01-09 PROCEDURE — 94799 UNLISTED PULMONARY SVC/PX: CPT

## 2023-01-09 PROCEDURE — 97166 OT EVAL MOD COMPLEX 45 MIN: CPT

## 2023-01-09 PROCEDURE — 97530 THERAPEUTIC ACTIVITIES: CPT

## 2023-01-09 RX ORDER — DOXYCYCLINE 100 MG/1
100 CAPSULE ORAL EVERY 12 HOURS SCHEDULED
Status: DISCONTINUED | OUTPATIENT
Start: 2023-01-09 | End: 2023-01-10 | Stop reason: HOSPADM

## 2023-01-09 RX ORDER — CEPHALEXIN 500 MG/1
500 CAPSULE ORAL EVERY 6 HOURS SCHEDULED
Status: DISCONTINUED | OUTPATIENT
Start: 2023-01-09 | End: 2023-01-10 | Stop reason: HOSPADM

## 2023-01-09 RX ADMIN — CEPHALEXIN 500 MG: 500 CAPSULE ORAL at 18:37

## 2023-01-09 RX ADMIN — DOXYCYCLINE 100 MG: 100 CAPSULE ORAL at 21:37

## 2023-01-09 RX ADMIN — METOPROLOL TARTRATE 50 MG: 50 TABLET, FILM COATED ORAL at 21:37

## 2023-01-09 RX ADMIN — DOXYCYCLINE 100 MG: 100 CAPSULE ORAL at 15:50

## 2023-01-09 RX ADMIN — INSULIN ASPART 3 UNITS: 100 INJECTION, SOLUTION INTRAVENOUS; SUBCUTANEOUS at 12:39

## 2023-01-09 RX ADMIN — METOPROLOL TARTRATE 50 MG: 50 TABLET, FILM COATED ORAL at 09:31

## 2023-01-09 RX ADMIN — HEPARIN SODIUM 5000 UNITS: 5000 INJECTION INTRAVENOUS; SUBCUTANEOUS at 09:31

## 2023-01-09 RX ADMIN — CEPHALEXIN 500 MG: 500 CAPSULE ORAL at 15:50

## 2023-01-09 RX ADMIN — INSULIN ASPART 3 UNITS: 100 INJECTION, SOLUTION INTRAVENOUS; SUBCUTANEOUS at 18:37

## 2023-01-09 RX ADMIN — HEPARIN SODIUM 5000 UNITS: 5000 INJECTION INTRAVENOUS; SUBCUTANEOUS at 21:37

## 2023-01-09 RX ADMIN — Medication 10 MG: at 21:37

## 2023-01-09 RX ADMIN — AMLODIPINE BESYLATE 10 MG: 10 TABLET ORAL at 09:33

## 2023-01-09 RX ADMIN — NYSTATIN: 100000 POWDER TOPICAL at 09:33

## 2023-01-09 RX ADMIN — SODIUM CHLORIDE 100 ML/HR: 9 INJECTION, SOLUTION INTRAVENOUS at 01:02

## 2023-01-09 RX ADMIN — PRAVASTATIN SODIUM 20 MG: 40 TABLET ORAL at 21:37

## 2023-01-09 NOTE — ED PROVIDER NOTES
Subjective   History of Present Illness     Nathalia Landa is 67-year-old female past medical history for morbid obesity presenting to the emergency department with bilateral lower extremity weakness/swelling and pain worse in the left lower extremity. Patient reports symptom onset 4-5 days prior. She reports significant swelling in the lower extremities limiting her mobility. Also reports worsening erythema of the left upper thigh. Patient reports her pain has gotten so bad she is unable to ambulate. She denies any fevers, cough or other infectious symptoms. No recent trauma to the lower extremities. No chest pain, dyspnea or history of blood clots. Patient has poor healthcare follow-up.    Review of Systems   Constitutional: Positive for activity change and fatigue. Negative for fever.   HENT: Negative.    Respiratory: Negative.  Negative for cough and shortness of breath.    Cardiovascular: Negative.  Negative for chest pain.   Gastrointestinal: Negative.  Negative for abdominal pain.   Endocrine: Negative.    Genitourinary: Negative.  Negative for dysuria.   Skin: Negative.    Neurological: Positive for weakness (BLE weakness and pain).   Psychiatric/Behavioral: Negative.    All other systems reviewed and are negative.      Past Medical History:   Diagnosis Date   • Hyperlipidemia    • Hypertension        No Known Allergies    Past Surgical History:   Procedure Laterality Date   • CHOLECYSTECTOMY         Family History   Problem Relation Age of Onset   • Hypertension Mother        Social History     Socioeconomic History   • Marital status: Unknown   Tobacco Use   • Smoking status: Never   • Smokeless tobacco: Never   Substance and Sexual Activity   • Alcohol use: Never   • Drug use: Never           Objective   Physical Exam  Vitals and nursing note reviewed.   Constitutional:       General: She is not in acute distress.     Appearance: She is ill-appearing.   HENT:      Head: Normocephalic and atraumatic.       Right Ear: Tympanic membrane normal.      Left Ear: Tympanic membrane normal.      Nose: Nose normal. No congestion.      Mouth/Throat:      Mouth: Mucous membranes are moist.      Pharynx: No oropharyngeal exudate.   Cardiovascular:      Rate and Rhythm: Normal rate and regular rhythm.      Pulses: Normal pulses.      Heart sounds: Normal heart sounds. No murmur heard.  Pulmonary:      Effort: Pulmonary effort is normal. No respiratory distress.      Breath sounds: Normal breath sounds.   Abdominal:      General: Bowel sounds are normal. There is distension.      Tenderness: There is no abdominal tenderness.   Musculoskeletal:         General: Swelling and tenderness present.      Right lower leg: Edema present.      Left lower leg: Edema present.      Comments: Significant swelling in 2+ pitting edema in the lower extremities bilaterally. Significant cellulosic changes to the bilateral lower extremities worse on the left upper thigh. No crepitus. Moderate skin breakdown with no necrosis.   Skin:     Capillary Refill: Capillary refill takes less than 2 seconds.   Neurological:      General: No focal deficit present.      Mental Status: She is alert and oriented to person, place, and time.         Procedures           ED Course  ED Course as of 01/08/23 2107   Thu Jan 05, 2023   2237 XR Tibia Fibula 2 View Left [LS]   2341 Normal sinus rhythm. Heart rate 66 BPM. MI interval 18 HMS.  MS. No notable ST segment elevation, reciprocal changes or other acute signs of ischemia. [LS]      ED Course User Index  [LS] Nicole Carpio MD                                           Medical Decision Making  Mrs. Sloan is 67-year-old female with morbid obesity presenting with bilateral lower extremity swelling/pain and weakness in the lower extremities. Patient is afebrile and hemodynamically stable on arrival. Basic labs, troponin, BNP, venous duplex, CT PE remarkable for WBC of 19 and negative DVT ultrasound. CT PE  scan shows no pulmonary embolism. X-ray of the lower extremities consistent with cellulosic changes no evidence of osteomyelitis. Patient started on vancomycin and Rocephin and admitted to the hospital as for further management.    CARLOS (acute kidney injury) (HCC): complicated acute illness or injury  Cellulitis, unspecified cellulitis site: complicated acute illness or injury  Weakness: complicated acute illness or injury  Amount and/or Complexity of Data Reviewed  Labs: ordered.  Radiology: ordered. Decision-making details documented in ED Course.  ECG/medicine tests: ordered.      Risk  Prescription drug management.  Decision regarding hospitalization.          Final diagnoses:   CARLOS (acute kidney injury) (HCC)   Cellulitis, unspecified cellulitis site   Weakness       ED Disposition  ED Disposition     ED Disposition   Decision to Admit    Condition   --    Comment   Level of Care: Telemetry [5]   Diagnosis: Cellulitis of lower extremity [[id number]]   Admitting Physician: REAL AGUERO [[id number]]   Attending Physician: REAL AGUERO [[id number]]   Certification: I Certify That Inpatient Hospital Services Are Medically Necessary For Greater Than 2 Midnights               No follow-up provider specified.       Medication List      No changes were made to your prescriptions during this visit.          Nicole Carpio MD  01/08/23 5378

## 2023-01-09 NOTE — PLAN OF CARE
Goal Outcome Evaluation:  Plan of Care Reviewed With: patient           Outcome Evaluation: Pt resting in bed on room air, sats above 90%. IVF infusing per order. VSS. No s/s of of acute distress noted. Pt refused AM labs; MD Issa aware.

## 2023-01-09 NOTE — THERAPY EVALUATION
Patient Name: Nathalia Landa  : 1955    MRN: [id number]                              Today's Date: 2023       Admit Date: 2023    Visit Dx:     ICD-10-CM ICD-9-CM   1. CARLOS (acute kidney injury) (HCC)  N17.9 584.9   2. Cellulitis, unspecified cellulitis site  L03.90 682.9   3. Weakness  R53.1 780.79     Patient Active Problem List   Diagnosis   • Cellulitis of lower extremity     Past Medical History:   Diagnosis Date   • Hyperlipidemia    • Hypertension      Past Surgical History:   Procedure Laterality Date   • CHOLECYSTECTOMY        General Information     Row Name 23 1430          OT Time and Intention    Document Type evaluation  -     Mode of Treatment individual therapy;occupational therapy  -     Row Name 23 1430          General Information    Patient Profile Reviewed yes  -     Prior Level of Function independent:;all household mobility;community mobility;ADL's;cooking;cleaning  use of rolling walker  -     Existing Precautions/Restrictions fall  -     Barriers to Rehab previous functional deficit  -     Row Name 23 1430          Occupational Profile    Reason for Services/Referral (Occupational Profile) Patient was admitted to [facility] on 2023. She was referred for OT evaluation due to change in functional performance with ADLs, functional mobility, and/or transfers.  -     Row Name 23 1430          Living Environment    People in Home child(radhika), dependent  -     Row Name 23 1430          Home Main Entrance    Number of Stairs, Main Entrance one  -     Row Name 23 1430          Stairs Within Home, Primary    Number of Stairs, Within Home, Primary none  -     Row Name 23 1430          Cognition    Orientation Status (Cognition) oriented x 3  -     Row Name 23 1430          Safety Issues, Functional Mobility    Impairments Affecting Function (Mobility) balance;endurance/activity tolerance;strength  -            User Key  (r) = Recorded By, (t) = Taken By, (c) = Cosigned By    Initials Name Provider Type     Bushra Armstrong, OT Occupational Therapist                 Mobility/ADL's     Row Name 01/09/23 1432          Bed Mobility    Comment, (Bed Mobility) Patient was sitting up in chair at bedside upon arrival.  -KP     Row Name 01/09/23 1432          Transfers    Transfers sit-stand transfer;stand-sit transfer  -KP     Row Name 01/09/23 1432          Sit-Stand Transfer    Sit-Stand [place] (Transfers) verbal cues;nonverbal cues (demo/gesture);contact guard  -     Assistive Device (Sit-Stand Transfers) walker, front-wheeled  -KP     Row Name 01/09/23 1432          Stand-Sit Transfer    Stand-Sit [place] (Transfers) verbal cues;nonverbal cues (demo/gesture);contact guard  -     Assistive Device (Stand-Sit Transfers) walker, front-wheeled  -KP     Row Name 01/09/23 1432          Activities of Daily Living    BADL Assessment/Intervention bathing;upper body dressing;lower body dressing;grooming;feeding;toileting  -KP     Row Name 01/09/23 1432          Bathing Assessment/Intervention    [place] Level (Bathing) bathing skills;minimum assist (75% patient effort);moderate assist (50% patient effort)  -KP     Row Name 01/09/23 1432          Upper Body Dressing Assessment/Training    [place] Level (Upper Body Dressing) upper body dressing skills;minimum assist (75% patient effort)  -KP     Row Name 01/09/23 1432          Lower Body Dressing Assessment/Training    [place] Level (Lower Body Dressing) lower body dressing skills;moderate assist (50% patient effort)  -KP     Row Name 01/09/23 1432          Grooming Assessment/Training    [place] Level (Grooming) grooming skills;minimum assist (75% patient effort)  -KP     Row Name 01/09/23 1432          Self-Feeding Assessment/Training    [place] Level (Feeding) feeding skills;minimum assist (75% patient effort)  -KP     Row Name 01/09/23  1432          Toileting Assessment/Training    [place] Level (Toileting) toileting skills;moderate assist (50% patient effort);minimum assist (75% patient effort)  -           User Key  (r) = Recorded By, (t) = Taken By, (c) = Cosigned By    Initials Name Provider Type    Bushra Perkins OT Occupational Therapist               Obj/Interventions     [facility] Name 01/09/23 1433          Sensory Assessment (Somatosensory)    Sensory Assessment (Somatosensory) UE sensation intact  -[facility] Name 01/09/23 1433          Vision Assessment/Intervention    Visual Impairment/Limitations WFL  -KP     [facility] Name 01/09/23 1433          Range of Motion Comprehensive    General Range of Motion bilateral upper extremity ROM WFL  -[facility] Name 01/09/23 1433          Strength Comprehensive (MMT)    Comment, General Manual Muscle Testing (MMT) Assessment 4/5 MMT in BUEs  -[facility] Name 01/09/23 1433          Motor Skills    Motor Skills coordination;functional endurance  -     Coordination WNL;bilateral;upper extremity  -KP     Functional Endurance fair  -[facility] Name 01/09/23 [facility]          Balance    Balance Assessment sitting static balance;standing static balance  -     Static Sitting Balance modified independence  -     Static Standing Balance standby assist  -           User Key  (r) = Recorded By, (t) = Taken By, (c) = Cosigned By    Initials Name Provider Type    Bushra Perkins OT Occupational Therapist               Goals/Plan     [facility] Name 01/09/23 1437          Transfer Goal 1 (OT)    Activity/Assistive Device (Transfer Goal 1, OT) toilet  -     [place] Level/Cues Needed (Transfer Goal 1, OT) modified independence  -KP     Time Frame (Transfer Goal 1, OT) by discharge  -[facility] Name 01/09/23 1437          Dressing Goal 1 (OT)    Activity/Device (Dressing Goal 1, OT) dressing skills, all  -KP     [place]/Cues Needed (Dressing Goal 1, OT) modified independence  -KP     Time Frame  (Dressing Goal 1, OT) by discharge  -     Row Name 01/09/23 1437          Problem Specific Goal 1 (OT)    Problem Specific Goal 1 (OT) Patient will perform sustained activity X12 minutes to promote functional endurance/activity tolerance needed for daily routine.  -     Time Frame (Problem Specific Goal 1, OT) by discharge  -     Row Name 01/09/23 1437          Therapy Assessment/Plan (OT)    Planned Therapy Interventions (OT) activity tolerance training;BADL retraining;ROM/therapeutic exercise;transfer/mobility retraining;strengthening exercise;patient/caregiver education/training  -           User Key  (r) = Recorded By, (t) = Taken By, (c) = Cosigned By    Initials Name Provider Type     Bushra Armstrong, KAI Occupational Therapist               Clinical Impression     Row Name 01/09/23 1434          Pain Assessment    Pretreatment Pain Rating 0/10 - no pain  -     Posttreatment Pain Rating 0/10 - no pain  -     Row Name 01/09/23 1434          Plan of Care Review    Progress no change  -     Outcome Evaluation Patient seen for OT evaluation. She presents with CGA for sit to stand/functional transfers using rolling, and minimal to moderate assist for simple ADLs. Patient would benefit from skilled OT services to address functional deficits to promote independence and safety prior to returning home. Recommend discharge home with assist and home health.  -     Row Name 01/09/23 1434          Therapy Assessment/Plan (OT)    Patient/Family Therapy Goal Statement (OT) return home  -     Rehab Potential (OT) good, to achieve stated therapy goals  -     Criteria for Skilled Therapeutic Interventions Met (OT) yes;meets criteria;skilled treatment is necessary  -     Therapy Frequency (OT) 3 times/wk  3-5x/week as able and available to promote functional progress  -     Row Name 01/09/23 1438          Therapy Plan Review/Discharge Plan (OT)    Anticipated Discharge Disposition (OT) home with  assist;home with home health  -     Row Name 01/09/23 1434          Positioning and Restraints    Pre-Treatment Position sitting in chair/recliner  -     Post Treatment Position chair  -     In Chair call light within reach  -           User Key  (r) = Recorded By, (t) = Taken By, (c) = Cosigned By    Initials Name Provider Type    Bushra Perkins OT Occupational Therapist               Outcome Measures    No documentation.                   OT Recommendation and Plan  Planned Therapy Interventions (OT): activity tolerance training, BADL retraining, ROM/therapeutic exercise, transfer/mobility retraining, strengthening exercise, patient/caregiver education/training  Therapy Frequency (OT): 3 times/wk (3-5x/week as able and available to promote functional progress)  Plan of Care Review  Progress: no change  Outcome Evaluation: Patient seen for OT evaluation. She presents with CGA for sit to stand/functional transfers using rolling, and minimal to moderate assist for simple ADLs. Patient would benefit from skilled OT services to address functional deficits to promote independence and safety prior to returning home. Recommend discharge home with assist and home health.     Time Calculation:    Time Calculation- OT     Row Name 01/09/23 1438             Time Calculation- OT    OT Start Time 1350  -      OT Received On 01/09/23  -            User Key  (r) = Recorded By, (t) = Taken By, (c) = Cosigned By    Initials Name Provider Type    Bushra Perkins OT Occupational Therapist              Therapy Charges for Today     Code Description Service Date Service Provider Modifiers Qty    [id number]  OT EVAL MOD COMPLEXITY 4 1/9/2023 Bushra Armstrong OT GO 1               Bushra Armstrong OT  1/9/2023

## 2023-01-09 NOTE — THERAPY TREATMENT NOTE
Acute Care - Physical Therapy Treatment Note   [place]     Patient Name: Nathalia Landa  : 1955  MRN: [id number]  Today's Date: 2023      Visit Dx:     ICD-10-CM ICD-9-CM   1. CARLOS (acute kidney injury) ([facility])  N17.9 584.9   2. Cellulitis, unspecified cellulitis site  L03.90 682.9   3. Weakness  R53.1 780.79     Patient Active Problem List   Diagnosis   • Cellulitis of lower extremity     Past Medical History:   Diagnosis Date   • Hyperlipidemia    • Hypertension      Past Surgical History:   Procedure Laterality Date   • CHOLECYSTECTOMY       PT Assessment (last 12 hours)     PT Evaluation and Treatment     Row Name 23 1329          Physical Therapy Time and Intention    Subjective Information complains of;swelling  -AG     Document Type therapy note (daily note)  -AG     Mode of Treatment individual therapy;physical therapy  -AG     Patient Effort good  -AG     Symptoms Noted During/After Treatment fatigue  -AG     Row Name 23 1322          General Information    Patient Profile Reviewed yes  -AG     Patient Observations agree to therapy;cooperative;alert  -AG     Patient/Family/Caregiver Comments/Observations pt. seated in bedside chair; B lower legs edematous with observed cellulitis.  L foot is weeping onto floor.  Pt. requests to ambulate to bathroom.  -AG     Existing Precautions/Restrictions fall  -AG     Risks Reviewed patient:;increased discomfort;LOB  -AG     Benefits Reviewed patient:;improve function;increase independence;increase strength;increase balance;decrease risk of DVT;increase knowledge  -AG     Barriers to Rehab previous functional deficit  -AG     Row Name 23 1327          Pain    Additional Documentation Pain Scale: FACES Pre/Post-Treatment (Group)  -AG     Row Name 23 2867          Pain Scale: FACES Pre/Post-Treatment    Pain: FACES Scale, Pretreatment 0-->no hurt  -AG     Posttreatment Pain Rating 0-->no hurt  -AG     Row Name 23 6854           Cognition    Affect/Mental Status (Cognition) WNL  -AG     Orientation Status (Cognition) oriented x 3  -AG     Follows Commands (Cognition) WFL  -     Personal Safety Interventions fall prevention program maintained;gait belt;nonskid shoes/slippers when out of bed;supervised activity  -     Row Name 01/09/23 1329          Sensory    Hearing Status WFL  -     Row Name 01/09/23 1329          Vision Assessment/Intervention    Visual Impairment/Limitations WFL  -     Row Name 01/09/23 1329          Mobility    Extremity Weight-bearing Status left lower extremity;right lower extremity  -AG     Left Lower Extremity (Weight-bearing Status) full weight-bearing (FWB)  -AG     Right Lower Extremity (Weight-bearing Status) full weight-bearing (FWB)  -     Row Name 01/09/23 1329          Bed Mobility    Comment, (Bed Mobility) not observed during treatment session  -[facility] Name 01/09/23 1329          Transfers    Transfers sit-stand transfer;stand-sit transfer;stand pivot/stand step transfer;toilet transfer  -     Row Name 01/09/23 1329          Bed-Chair Transfer    Bed-Chair [place] (Transfers) verbal cues;nonverbal cues (demo/gesture);contact guard  -AG     Assistive Device (Bed-Chair Transfers) walker, front-wheeled  -     Row Name 01/09/23 1329          Sit-Stand Transfer    Sit-Stand [place] (Transfers) verbal cues;nonverbal cues (demo/gesture);contact guard  -AG     Assistive Device (Sit-Stand Transfers) walker, front-wheeled  -[facility] Name 01/09/23 1329          Stand-Sit Transfer    Stand-Sit [place] (Transfers) verbal cues;nonverbal cues (demo/gesture);contact guard  -AG     Assistive Device (Stand-Sit Transfers) walker, front-wheeled  -AG     [facility] Name 01/09/23 1329          Stand Pivot/Stand Step Transfer    Stand Pivot/Stand Step [place] (Transfers) verbal cues;nonverbal cues (demo/gesture);contact guard  -AG     Assistive Device (Stand Pivot Stand Step Transfer) walker,  front-wheeled  -AG     Row Name 01/09/23 1329          Toilet Transfer    Type (Toilet Transfer) stand pivot/stand step  -AG     [place] Level (Toilet Transfer) verbal cues;nonverbal cues (demo/gesture);minimum assist (75% patient effort)  -     Assistive Device (Toilet Transfer) commode;grab bars/safety frame;walker, front-wheeled  -AG     Row Name 01/09/23 1329          Gait/Stairs (Locomotion)    Gait/Stairs Locomotion gait/ambulation independence;gait/ambulation assistive device;distance ambulated;gait pattern;gait deviations  -     [place] Level (Gait) verbal cues;nonverbal cues (demo/gesture);contact guard  -     Assistive Device (Gait) walker, front-wheeled  -     Distance in Feet (Gait) 10 ft x 2  -AG     Pattern (Gait) step-to  -AG     Deviations/Abnormal Patterns (Gait) base of support, wide  -AG     Bilateral Gait Deviations forward flexed posture  -AG     Left Sided Gait Deviations weight shift ability decreased  -     Row Name 01/09/23 1329          Safety Issues, Functional Mobility    Impairments Affecting Function (Mobility) balance;endurance/activity tolerance;strength  -     Row Name 01/09/23 1329          Balance    Balance Assessment sitting static balance;sitting dynamic balance;sit to stand dynamic balance;standing static balance;standing dynamic balance  -     Static Sitting Balance independent  -AG     Position, Sitting Balance sitting in chair  -     Static Standing Balance standby assist;contact guard;verbal cues;non-verbal cues (demo/gesture)  -     Dynamic Standing Balance contact guard;non-verbal cues (demo/gesture);verbal cues  -     Position/Device Used, Standing Balance walker, front-wheeled  -     Row Name 01/09/23 1329          Motor Skills    Therapeutic Exercise hip;knee;ankle  -     Row Name 01/09/23 1329          Hip (Therapeutic Exercise)    Hip (Therapeutic Exercise) strengthening exercise  -     Hip Strengthening (Therapeutic Exercise)  bilateral;flexion;extension;marching while seated  -AG     Row Name 01/09/23 1329          Knee (Therapeutic Exercise)    Knee (Therapeutic Exercise) strengthening exercise  -AG     Knee Strengthening (Therapeutic Exercise) bilateral;flexion;extension;marching while seated;LAQ (long arc quad);sitting  -AG     Row Name 01/09/23 1329          Ankle (Therapeutic Exercise)    Ankle (Therapeutic Exercise) strengthening exercise  -AG     Ankle Strengthening (Therapeutic Exercise) bilateral;dorsiflexion;sitting  -AG     Row Name             Wound 01/06/23 0440 Bilateral midline sacral spine Pressure Injury    Wound - Properties Group Placement Date: 01/06/23 -AA Placement Time: 0440  -AA Present on Hospital Admission: Y  -AA Side: Bilateral  -AA Orientation: midline  -AA Location: sacral spine  -AA Primary Wound Type: Pressure inj  -AA    Retired Wound - Properties Group Placement Date: 01/06/23 -AA Placement Time: 0440  -AA Present on Hospital Admission: Y  -AA Side: Bilateral  -AA Orientation: midline  -AA Location: sacral spine  -AA Primary Wound Type: Pressure inj  -AA    Retired Wound - Properties Group Date first assessed: 01/06/23 -AA Time first assessed: 0440  -AA Present on Hospital Admission: Y  -AA Side: Bilateral  -AA Location: sacral spine  -AA Primary Wound Type: Pressure inj  -AA    Row Name             Wound 01/06/23 0442 Right medial gluteal Pressure Injury    Wound - Properties Group Placement Date: 01/06/23 -AA Placement Time: 0442  -AA Present on Hospital Admission: Y  -AA Side: Right  -AA Orientation: medial  -AA Location: gluteal  -AA Primary Wound Type: Pressure inj  -AA    Retired Wound - Properties Group Placement Date: 01/06/23 -AA Placement Time: 0442  -AA Present on Hospital Admission: Y  -AA Side: Right  -AA Orientation: medial  -AA Location: gluteal  -AA Primary Wound Type: Pressure inj  -AA    Retired Wound - Properties Group Date first assessed: 01/06/23 -AA Time first assessed: 0442   -AA Present on Hospital Admission: Y  -AA Side: Right  -AA Location: gluteal  -AA Primary Wound Type: Pressure inj  -AA    Row Name             Wound 01/06/23 0443 Left posterior heel Other (comment)    Wound - Properties Group Placement Date: 01/06/23 -AA Placement Time: 0443  -AA Present on Hospital Admission: Y  -AA Side: Left  -AA Orientation: posterior  -AA Location: heel  -AA Primary Wound Type: Other  -AA, Maceration     Retired Wound - Properties Group Placement Date: 01/06/23 -AA Placement Time: 0443  -AA Present on Hospital Admission: Y  -AA Side: Left  -AA Orientation: posterior  -AA Location: heel  -AA Primary Wound Type: Other  -AA, Maceration     Retired Wound - Properties Group Date first assessed: 01/06/23 -AA Time first assessed: 0443  -AA Present on Hospital Admission: Y  -AA Side: Left  -AA Location: heel  -AA Primary Wound Type: Other  -AA, Maceration     Row Name             Wound 01/06/23 0444 Left lower leg Other (comment)    Wound - Properties Group Placement Date: 01/06/23 -AA Placement Time: 0444  -AA Present on Hospital Admission: Y  -AA Side: Left  -AA Orientation: lower  -AA Location: leg  -AA Primary Wound Type: Other  -AA, cellulitus     Retired Wound - Properties Group Placement Date: 01/06/23 -AA Placement Time: 0444  -AA Present on Hospital Admission: Y  -AA Side: Left  -AA Orientation: lower  -AA Location: leg  -AA Primary Wound Type: Other  -AA, cellulitus     Retired Wound - Properties Group Date first assessed: 01/06/23 -AA Time first assessed: 0444  -AA Present on Hospital Admission: Y  -AA Side: Left  -AA Location: leg  -AA Primary Wound Type: Other  -AA, cellulitus     Row Name             Wound 01/06/23 0445 Left popliteal Abcess    Wound - Properties Group Placement Date: 01/06/23 -AA Placement Time: 0445  -AA Present on Hospital Admission: Y  -AA Side: Left  -AA Location: popliteal  -AA Primary Wound Type: Abcess  -AA    Retired Wound - Properties Group Placement  Date: 01/06/23  -AA Placement Time: 0445  -AA Present on Hospital Admission: Y  -AA Side: Left  -AA Location: popliteal  -AA Primary Wound Type: Abcess  -AA    Retired Wound - Properties Group Date first assessed: 01/06/23 -AA Time first assessed: 0445  -AA Present on Hospital Admission: Y  -AA Side: Left  -AA Location: popliteal  -AA Primary Wound Type: Abcess  -AA    Row Name             Wound 01/09/23 1041 Right lower arm Extravasation    Wound - Properties Group Placement Date: 01/09/23  -MP Placement Time: 1041  -MP Side: Right  -MP Orientation: lower  -MP Location: arm  -MP Primary Wound Type: Extravasatio  -MP    Retired Wound - Properties Group Placement Date: 01/09/23  -MP Placement Time: 1041  -MP Side: Right  -MP Orientation: lower  -MP Location: arm  -MP Primary Wound Type: Extravasatio  -MP    Retired Wound - Properties Group Date first assessed: 01/09/23  -MP Time first assessed: 1041  -MP Side: Right  -MP Location: arm  -MP Primary Wound Type: Extravasatio  -MP    Row Name 01/09/23 1329          Coping    Observed Emotional State calm;cooperative  -AG     Verbalized Emotional State acceptance  -AG     Trust Relationship/Rapport care explained;choices provided;thoughts/feelings acknowledged  -AG     Row Name 01/09/23 1329          Plan of Care Review    Plan of Care Reviewed With patient  -AG     Outcome Evaluation pt. seen for transfer and gait training today requiring CGA for most functional mobility skills; min A for toilet transfer w/ RW and elevated surface/ riser.  Pt. able to ambulate w/ RW and CGA 10 ft x 2. Voiced fatigue following treatment.  -AG     Row Name 01/09/23 1329          Positioning and Restraints    Pre-Treatment Position sitting in chair/recliner  -AG     Post Treatment Position chair  -AG     In Chair sitting;call light within reach;encouraged to call for assist;notified nsg  -AG     Row Name 01/09/23 1329          Progress Summary (PT)    Progress Toward Functional Goals (PT)  progress toward functional goals is good  -AG     Row Name 01/09/23 1329          Therapy Plan Review/Discharge Plan (PT)    Therapy Plan Review (PT) evaluation/treatment results reviewed;care plan/treatment goals reviewed;risks/benefits reviewed;current/potential barriers reviewed;participants voiced agreement with care plan;participants included;patient  -AG           User Key  (r) = Recorded By, (t) = Taken By, (c) = Cosigned By    Initials Name Provider Type    Adela Jack, RN Registered Nurse    Malika Andujar, PT Physical Therapist    Mendy Arzola, RN Registered Nurse                Physical Therapy Education     Title: PT OT SLP Therapies (Done)     Topic: Physical Therapy (Done)     Point: Mobility training (Done)     Learning Progress Summary           Patient Acceptance, E,D, VU,NR by  at 1/9/2023 1328    Acceptance, E, VU by  at 1/7/2023 0329    Acceptance, E,TB, VU by  at 1/6/2023 1411                   Point: Home exercise program (Done)     Learning Progress Summary           Patient Acceptance, E,D, VU,NR by  at 1/9/2023 1328    Acceptance, E, VU by  at 1/7/2023 0329    Acceptance, E,TB, VU by  at 1/6/2023 1411                   Point: Body mechanics (Done)     Learning Progress Summary           Patient Acceptance, E,D, VU,NR by  at 1/9/2023 1328    Acceptance, E, VU by  at 1/7/2023 0329    Acceptance, E,TB, VU by  at 1/6/2023 1411                   Point: Precautions (Done)     Learning Progress Summary           Patient Acceptance, E,D, VU,NR by  at 1/9/2023 1328    Acceptance, E, VU by  at 1/7/2023 0329    Acceptance, E,TB, VU by  at 1/6/2023 1411                               User Key     Initials Effective Dates Name Provider Type Discipline     06/16/21 -  Malika Sanchez, PT Physical Therapist PT    JF 09/01/20 -  Jayshree Gilbert, RN Registered Nurse Nurse     05/24/22 -  Jordy Martinez, JOSE ENRIQUE Physical Therapist PT              PT Recommendation and  Plan  Anticipated Discharge Disposition (PT): home with assist, home with home health  Progress Summary (PT)  Progress Toward Functional Goals (PT): progress toward functional goals is good  Plan of Care Reviewed With: patient  Outcome Evaluation: pt. seen for transfer and gait training today requiring CGA for most functional mobility skills; min A for toilet transfer w/ RW and elevated surface/ riser.  Pt. able to ambulate w/ RW and CGA 10 ft x 2. Voiced fatigue following treatment.       Time Calculation:    PT Charges     Row Name 01/09/23 1328             Time Calculation    PT Received On 01/09/23  -            User Key  (r) = Recorded By, (t) = Taken By, (c) = Cosigned By    Initials Name Provider Type     Malika Sanchez, PT Physical Therapist              Therapy Charges for Today     Code Description Service Date Service Provider Modifiers Qty    58027510095 HC GAIT TRAINING EA 15 MIN 1/9/2023 Malika Sanchez, PT GP 1    [id number]  PT THER PROC EA 15 MIN 1/9/2023 Malika Sanchez, PT GP 1    [id number]  PT THERAPEUTIC ACT EA 15 MIN 1/9/2023 Malika Sanchez, PT GP 1          PT G-Codes  AM-PAC 6 Clicks Score (PT): 10    Malika Sanchez PT  1/9/2023

## 2023-01-09 NOTE — CASE MANAGEMENT/SOCIAL WORK
Discharge Planning Assessment   [place]     Patient Name: Nathalia Landa  MRN: [id number]  Today's Date: 1/9/2023    Admit Date: 1/5/2023    Plan: Pt resides at home with 16 year old Grandson and Son and Dtr in Law residing next door and plans to return home at discharge.  Pt currently does not utilize home health serivces.  Pt requests home health services at discharge.  Pt currently utilizes rolling walker.  SS will follow and assist with discharge disposition.     Discharge Plan     Row Name 01/09/23 4493       Plan    Plan Pt resides at home with 16 year old Grandson and Son and Dtr in Law residing next door and plans to return home at discharge.  Pt currently does not utilize home health serivces.  Pt requests home health services at discharge.  Pt currently utilizes rolling walker.  SS will follow and assist with discharge disposition.              Karol Moreno, BSW

## 2023-01-09 NOTE — PLAN OF CARE
Goal Outcome Evaluation:           Progress: no change  Outcome Evaluation: Patient seen for OT evaluation. She presents with CGA for sit to stand/functional transfers using rolling, and minimal to moderate assist for simple ADLs. Patient would benefit from skilled OT services to address functional deficits to promote independence and safety prior to returning home. Recommend discharge home with assist and home health.

## 2023-01-09 NOTE — PLAN OF CARE
Goal Outcome Evaluation:      Wound care completed per order. Loss of IV access this shift, MD aware. See orders. Patient worked with PT this shift, see note. Lying in bed with no S&S of distress. No complaints at this time. Will continue to monitor and follow plan of care.

## 2023-01-10 VITALS
HEART RATE: 101 BPM | HEIGHT: 65 IN | OXYGEN SATURATION: 97 % | SYSTOLIC BLOOD PRESSURE: 134 MMHG | WEIGHT: 293 LBS | DIASTOLIC BLOOD PRESSURE: 63 MMHG | BODY MASS INDEX: 48.82 KG/M2 | RESPIRATION RATE: 20 BRPM | TEMPERATURE: 98 F

## 2023-01-10 LAB
ANION GAP SERPL CALCULATED.3IONS-SCNC: 12 MMOL/L (ref 5–15)
BACTERIA SPEC AEROBE CULT: NORMAL
BACTERIA SPEC AEROBE CULT: NORMAL
BUN SERPL-MCNC: 40 MG/DL (ref 8–23)
BUN/CREAT SERPL: 37 (ref 7–25)
CALCIUM SPEC-SCNC: 8.8 MG/DL (ref 8.6–10.5)
CHLORIDE SERPL-SCNC: 98 MMOL/L (ref 98–107)
CO2 SERPL-SCNC: 22 MMOL/L (ref 22–29)
CREAT SERPL-MCNC: 1.08 MG/DL (ref 0.57–1)
EGFRCR SERPLBLD CKD-EPI 2021: 56.4 ML/MIN/1.73
GLUCOSE BLDC GLUCOMTR-MCNC: 143 MG/DL (ref 70–130)
GLUCOSE BLDC GLUCOMTR-MCNC: 223 MG/DL (ref 70–130)
GLUCOSE SERPL-MCNC: 146 MG/DL (ref 65–99)
OSMOLALITY UR: 549 MOSM/KG
POTASSIUM SERPL-SCNC: 3.9 MMOL/L (ref 3.5–5.2)
SODIUM SERPL-SCNC: 132 MMOL/L (ref 136–145)

## 2023-01-10 PROCEDURE — 99238 HOSP IP/OBS DSCHRG MGMT 30/<: CPT | Performed by: INTERNAL MEDICINE

## 2023-01-10 PROCEDURE — 25010000002 HEPARIN (PORCINE) PER 1000 UNITS: Performed by: HOSPITALIST

## 2023-01-10 PROCEDURE — 82962 GLUCOSE BLOOD TEST: CPT

## 2023-01-10 PROCEDURE — 94761 N-INVAS EAR/PLS OXIMETRY MLT: CPT

## 2023-01-10 PROCEDURE — 63710000001 INSULIN ASPART PER 5 UNITS: Performed by: HOSPITALIST

## 2023-01-10 PROCEDURE — 94799 UNLISTED PULMONARY SVC/PX: CPT

## 2023-01-10 PROCEDURE — 80048 BASIC METABOLIC PNL TOTAL CA: CPT | Performed by: INTERNAL MEDICINE

## 2023-01-10 RX ORDER — AMLODIPINE BESYLATE 10 MG/1
10 TABLET ORAL DAILY
Qty: 30 TABLET | Refills: 0 | Status: SHIPPED | OUTPATIENT
Start: 2023-01-10

## 2023-01-10 RX ORDER — CEPHALEXIN 500 MG/1
500 CAPSULE ORAL EVERY 6 HOURS SCHEDULED
Qty: 4 CAPSULE | Refills: 0 | Status: SHIPPED | OUTPATIENT
Start: 2023-01-10 | End: 2023-01-11

## 2023-01-10 RX ORDER — PRAVASTATIN SODIUM 20 MG
20 TABLET ORAL NIGHTLY
Qty: 30 TABLET | Refills: 0 | Status: SHIPPED | OUTPATIENT
Start: 2023-01-10

## 2023-01-10 RX ORDER — NYSTATIN 100000 [USP'U]/G
POWDER TOPICAL 2 TIMES DAILY
Qty: 60 G | Refills: 0 | Status: SHIPPED | OUTPATIENT
Start: 2023-01-10 | End: 2023-02-03

## 2023-01-10 RX ORDER — DOXYCYCLINE 100 MG/1
100 CAPSULE ORAL EVERY 12 HOURS SCHEDULED
Qty: 1 CAPSULE | Refills: 0 | Status: SHIPPED | OUTPATIENT
Start: 2023-01-10 | End: 2023-01-11

## 2023-01-10 RX ORDER — METOPROLOL TARTRATE 50 MG/1
50 TABLET, FILM COATED ORAL 2 TIMES DAILY
Qty: 60 TABLET | Refills: 0 | Status: SHIPPED | OUTPATIENT
Start: 2023-01-10

## 2023-01-10 RX ADMIN — DOXYCYCLINE 100 MG: 100 CAPSULE ORAL at 08:58

## 2023-01-10 RX ADMIN — NYSTATIN: 100000 POWDER TOPICAL at 08:59

## 2023-01-10 RX ADMIN — CEPHALEXIN 500 MG: 500 CAPSULE ORAL at 00:47

## 2023-01-10 RX ADMIN — HEPARIN SODIUM 5000 UNITS: 5000 INJECTION INTRAVENOUS; SUBCUTANEOUS at 08:58

## 2023-01-10 RX ADMIN — CEPHALEXIN 500 MG: 500 CAPSULE ORAL at 12:07

## 2023-01-10 RX ADMIN — CEPHALEXIN 500 MG: 500 CAPSULE ORAL at 05:44

## 2023-01-10 RX ADMIN — METOPROLOL TARTRATE 50 MG: 50 TABLET, FILM COATED ORAL at 08:58

## 2023-01-10 RX ADMIN — INSULIN ASPART 3 UNITS: 100 INJECTION, SOLUTION INTRAVENOUS; SUBCUTANEOUS at 12:06

## 2023-01-10 RX ADMIN — AMLODIPINE BESYLATE 10 MG: 10 TABLET ORAL at 08:58

## 2023-01-10 NOTE — DISCHARGE SUMMARY
[facility] Medicine Services  DISCHARGE SUMMARY    Patient Identification:  Name:  Nathalia Landa  Age:  67 y.o.  Sex:  female  :  1955  MRN:  [id number]  Visit Number:  [id number]    Date of Admission: 2023  Date of Discharge:  1/10/2023    PCP: Provider, No Known      Admission/Discharge Diagnoses     Discharge Diagnoses:  · LLE cellulitis   · Hypovolemic hyponatremia  · Skin fold candidiasis  · Chronic lymphedema needing compression dressing    Consults/Procedures     Consults:   Consults     Date and Time Order Name Status Description    2023  8:16 AM Inpatient General Surgery Consult            Procedures Performed:  none    Hospital Course      Nathalia Landa was admitted for left lower extremity cellulitis. She was started on IV vancomycin with improvement of cellulitis. Antibiotic was changed to oral regimen yesterday to Keflex and Doxycycline for additional 2 days to complete a 5 day course. Her erythema has improved significantly. She had small blisters that had ruptured and leaking fluid due to edema.  She has chronic lymphedema, likely cause of cellulitis.  Will need compression dressing to control edema.  Will need f/u with PCP in 7-10 days to send referral for compression dressing. In the meantime will need  nursing for dressing changes as needed and monitor for worsening cellulitis or wounds and to assist with compression dressing.   She was also found to have hyponatremia (120) and noted to be due to volume depletion. Her HCTZ was held, she received IVF. Her urine sodium was <20 with elevated serum osmol, diagnostic for volume depletion.  Sodium has improved to 132 today (corrected over the past several days since admission). Therefore, I have discontinued her home HCTZ as causing intravascular volume depletion.  Only effective lymphedema treatment is LE compression dressing and manual  lymph drainage if needed. Defer to PCP to arrange as outpatient. Her blood  pressure has been in normal rage here and not needed additional treatment. Can continue home Norvasc and Metoprolol.     Discharge Vitals/Physical Examination     Vital Signs:  Temp:  [97.9 °F (36.6 °C)-98.3 °F (36.8 °C)] 98 °F (36.7 °C)  Heart Rate:  [] 101  Resp:  [20] 20  BP: (114-134)/(54-63) 134/63  Mean Arterial Pressure (Non-Invasive) for the past 24 hrs (Last 3 readings):   Noninvasive MAP (mmHg)   01/10/23 1019 82   01/10/23 0155 74   01/09/23 1832 76     SpO2 Percentage    01/10/23 0620 01/10/23 0845 01/10/23 1019   SpO2: 96% 98% 97%     SpO2:  [95 %-98 %] 97 %  on   ;   Device (Oxygen Therapy): room air    Body mass index is 51.47 kg/m².  Wt Readings from Last 3 Encounters:   01/09/23 (!) 140 kg (309 lb 4.8 oz)         Physical Exam:  GEN: NAD  EYES:  HENT:  CV: RRR, no murmur  PULM: CTA, no wheeze or crackles  SKIN: + lymphedema BLE's, LLE erythema cont to improve. RUE edema has resolved (infiltrated IV, negative duplex for DVT)  PSYCH: alert and oriented x 4, no confusion. Pleasant.     Pertinent Laboratory/Radiology Results     Pertinent Laboratory Results:  Results from last 7 days   Lab Units 01/05/23  1838   TROPONIN T ng/mL <0.010     Results from last 7 days   Lab Units 01/05/23  1838   PROBNP pg/mL 164.0     Results from last 7 days   Lab Units 01/06/23  0453   CHOLESTEROL mg/dL 214*   TRIGLYCERIDES mg/dL 165*   HDL CHOL mg/dL 36*   LDL CHOL mg/dL 148*       Results from last 7 days   Lab Units 01/08/23  0240 01/07/23  0105 01/06/23  0453 01/06/23  0201 01/05/23  2253 01/05/23  1838   CRP mg/dL  --   --  3.49*  --   --  4.55*   LACTATE mmol/L  --   --   --  1.6 2.2*  --    WBC 10*3/mm3 12.07* 13.53* 19.44*  --   --  19.86*   HEMOGLOBIN g/dL 12.4 12.8 13.3  --   --  14.2   HEMATOCRIT % 37.5 37.6 38.8  --   --  41.4   MCV fL 86.6 84.5 83.4  --   --  83.1   MCHC g/dL 33.1 34.0 34.3  --   --  34.3   PLATELETS 10*3/mm3 300 343 373  --   --  429     Results from last 7 days   Lab Units  01/10/23  0131 01/08/23  1600 01/08/23  0240 01/07/23  1907 01/07/23  0105 01/06/23  0453 01/05/23  1838   SODIUM mmol/L 132* 125* 127* 124* 128* 120* 125*   POTASSIUM mmol/L 3.9 4.2 3.4* 3.6 3.8 4.0 4.1   MAGNESIUM mg/dL  --   --   --   --  2.8*  --   --    CHLORIDE mmol/L 98  --  96* 91* 94* 85* 84*   CO2 mmol/L 22.0  --  19.3* 21.6* 19.8* 16.4* 22.3   BUN mg/dL 40*  --  73* 77* 89* 111* 124*   CREATININE mg/dL 1.08*  --  1.29* 1.49* 1.42* 1.91* 2.37*   CALCIUM mg/dL 8.8  --  8.3* 8.7 8.5* 8.9 9.4   GLUCOSE mg/dL 146*  --  138* 193* 137* 164* 215*   ALBUMIN g/dL  --   --   --   --   --  2.8* 3.4*   BILIRUBIN mg/dL  --   --   --   --   --  0.4 0.5   ALK PHOS U/L  --   --   --   --   --  163* 193*   AST (SGOT) U/L  --   --   --   --   --  20 15   ALT (SGPT) U/L  --   --   --   --   --  21 21   Estimated Creatinine Clearance: 72 mL/min (A) (by C-G formula based on SCr of 1.08 mg/dL (H)).  No results found for: AMMONIA    Glucose   Date/Time Value Ref Range Status   01/10/2023 1012 223 (H) 70 - 130 mg/dL Final     Comment:     Meter: [id number] : [id number] kemal myers   01/10/2023 0632 143 (H) 70 - 130 mg/dL Final     Comment:     Meter: [id number] : [id number] kemal myers   01/09/2023 1831 209 (H) 70 - 130 mg/dL Final     Comment:     Meter: [id number] : [id number] LILLIAM VAZQUEZ   01/09/2023 1011 241 (H) 70 - 130 mg/dL Final     Comment:     Meter: [id number] : [id number] kemal myers   01/09/2023 0619 131 (H) 70 - 130 mg/dL Final     Comment:     Meter: [id number] : [id number] kemal myers   01/08/2023 1953 208 (H) 70 - 130 mg/dL Final     Comment:     Meter: [id number] : [id number] ASMITA DEL ANGEL   01/08/2023 1627 161 (H) 70 - 130 mg/dL Final     Comment:     Meter: [id number] : [id number] GEROGIA FAJARDO   01/08/2023 1035 228 (H) 70 - 130 mg/dL Final     Comment:     Meter: [id number] : [id number] keiry robert     Lab Results   Component Value Date    HGBA1C 7.60 (H)  01/05/2023     Lab Results   Component Value Date    TSH 1.660 01/08/2023    FREET4 1.33 01/08/2023       Blood Culture   Date Value Ref Range Status   01/05/2023 No growth at 4 days  Preliminary   01/05/2023 No growth at 4 days  Preliminary     Urine Culture   Date Value Ref Range Status   01/05/2023 >100,000 CFU/mL Mixed Cris Isolated  Final       Microbiology Results (last 10 days)     Procedure Component Value - Date/Time    Blood Culture - Blood, Arm, Left [[id number]]  (Normal) Collected: 01/05/23 2253    Lab Status: Preliminary result Specimen: Blood from Arm, Left Updated: 01/09/23 2300     Blood Culture No growth at 4 days    Blood Culture - Blood, Arm, Right [[id number]]  (Normal) Collected: 01/05/23 2253    Lab Status: Preliminary result Specimen: Blood from Arm, Right Updated: 01/09/23 2300     Blood Culture No growth at 4 days    Urine Culture - Urine, Suprapubic Catheter [[id number]] Collected: 01/05/23 2218    Lab Status: Final result Specimen: Urine from Suprapubic Catheter Updated: 01/07/23 0942     Urine Culture >100,000 CFU/mL Mixed Cris Isolated    Narrative:      Specimen contains mixed organisms of questionable pathogenicity suggestive of contamination. If symptoms persist, suggest recollection.  Colonization of the urinary tract without infection is common. Treatment is discouraged unless the patient is symptomatic, pregnant, or undergoing an invasive urologic procedure.    COVID-19 and FLU A/B PCR - Swab, Nasopharynx [[id number]]  (Normal) Collected: 01/05/23 2132    Lab Status: Final result Specimen: Swab from Nasopharynx Updated: 01/05/23 2157     COVID19 Not Detected     Influenza A PCR Not Detected     Influenza B PCR Not Detected    Narrative:      Fact sheet for providers: https://www.fda.gov/media/572909/download    Fact sheet for patients: https://www.fda.gov/media/882924/download    Test performed by PCR.        Pain Management Panel    There is no flowsheet data to display.          Pertinent Radiology Results:  Imaging Results (All)     Procedure Component Value Units Date/Time    US Venous Doppler Upper Extremity Right (duplex) [[id number]] Collected: 01/10/23 0744     Updated: 01/10/23 0746    Narrative:      EXAMINATION: US VENOUS DOPPLER UPPER EXTREMITY RIGHT (DUPLEX)-      CLINICAL INDICATION: edema; N17.9-Acute kidney failure, unspecified;  L03.90-Cellulitis, unspecified; R53.1-Weakness        COMPARISON: None available     PROCEDURE:  Color Doppler imaging with compression and augmentation to evaluate the  right upper extremity deep venous system.     FINDINGS:  There is no internal clot or area of noncompressibility in the jugular,  subclavian, axillary, brachial, radial, or ulnar veins     Normal augmentation elicited where applicable.       Impression:      No DVT in the right upper extremity      This report was finalized on 1/10/2023 7:44 AM by Dr. Lawrence Cortez MD.       US Renal Bilateral [[id number]] Collected: 01/07/23 1105     Updated: 01/07/23 1107    Narrative:      EXAMINATION: US RENAL BILATERAL-      CLINICAL INDICATION: look for evidence of CKD; N17.9-Acute kidney  failure, unspecified; L03.90-Cellulitis, unspecified; R53.1-Weakness        COMPARISON: None available     PROCEDURE: Sonographic imaging of the kidneys     FINDINGS:  Imaging of the right kidney demonstrates the right kidney measuring  10.54 x 4.69 cm. No solid mass or hydronephrosis.     Left kidney measures 10.61 x 4.61 cm. No solid mass or hydronephrosis       Impression:      1. Unremarkable sonographic appearance of the kidneys.     This report was finalized on 1/7/2023 11:05 AM by Dr. Lawrence Cortez MD.       XR Tibia Fibula 2 View Right [[id number]] Collected: 01/05/23 2228     Updated: 01/05/23 2230    Narrative:      CR Tibia Fibula 2 Vws RT    INDICATION:   Leg swelling.    COMPARISON:   None available.    FINDINGS:   AP and lateral views of the right tibia/fibula.     Subcutaneous soft tissue  edema. No fracture or dislocation. Tricompartmental knee osteoarthrosis, most advanced in the medial compartment. Visualized portions of the ankle joints are in anatomic alignment. No foreign body.      Impression:      1.  Subcutaneous edema. No acute osseous abnormality.  2.  Tricompartmental knee osteoarthrosis.    Signer Name: Ezio Boyer MD   Signed: 1/5/2023 10:28 PM   Workstation Name: [id number]    [facility]    XR Femur 2 View Left [[id number]] Collected: 01/05/23 2227     Updated: 01/05/23 2229    Narrative:      CR Femur 2 Vws LT    INDICATION:   left leg eyrthema and pain    COMPARISON:   None available.    FINDINGS:   AP and lateral views of the left femur.      Evaluation of the proximal femur is limited due to patient body habitus. No fracture or dislocation. Tricompartmental osteoarthrosis of the knee.  No foreign body.      Impression:      1.  Evaluation of the proximal femur is limited due to patient body habitus. No acute osseous findings.  2.  Tricompartmental knee osteoarthrosis.    Signer Name: Ezio Boyer MD   Signed: 1/5/2023 10:27 PM   Workstation Name: [id number]    [facility]    XR Tibia Fibula 2 View Left [[id number]] Collected: 01/05/23 2226     Updated: 01/05/23 2228    Narrative:      CR Tibia Fibula 2 Vws LT    INDICATION:   Erythema.    COMPARISON:   None available.    FINDINGS:   AP and lateral views of the left tibia/fibula.      Subcutaneous soft tissue edema. No fracture or dislocation.  Tricompartmental osteoarthrosis, most advanced in the medial compartment. Visualized ankle joint is anatomic alignment. Plantar calcaneal spur. No foreign body.      Impression:      1.  No fracture or dislocation.  2.  Subcutaneous soft tissue edema.  3.  Tricompartmental knee osteoarthrosis.    Signer Name: Ezio Boyer MD   Signed: 1/5/2023 10:26 PM   Workstation Name: [id number]    [facility]    XR Chest 1 View [[id number]]  Collected: 01/05/23 2004     Updated: 01/05/23 2006    Narrative:      CR Chest 1 Vw    INDICATION:   Bilateral lower extremity swelling     COMPARISON:    July 7, 2007    FINDINGS:  Portable AP view(s) of the chest.    The heart size is enlarged. Mediastinal structures are midline. Pulmonary vascularity is normal. The lungs are clear. No pleural fluid. No pneumothorax.      Impression:      Cardiomegaly. No acute infiltrates.    Signer Name: Donte Mahmood MD   Signed: 1/5/2023 8:04 PM   [facility]    [facility]    US Venous Doppler Lower Extremity Bilateral (duplex) [[id number]] Collected: 01/05/23 2001     Updated: 01/05/23 2003    Narrative:      US Veins LE Duplex BILAT    HISTORY:   Bilateral leg swelling and pain    TECHNIQUE:   Real-time ultrasound was performed of both lower extremities utilizing spectral and color Doppler with compression and augmentation techniques.    COMPARISON:  None available.    FINDINGS:  Right Lower Extremity:  There is no deep venous thrombus seen in the right lower extremity, including the right common femoral veins, right femoral veins and right popliteal veins.  Normal compressibility and respiratory phasicity was visualized.  No calf vein thrombus. Greater  saphenous vein is patent.    Left Lower Extremity:  There is no deep venous thrombus seen in the left lower extremity, including the left common femoral veins, left femoral veins and left popliteal veins.   Normal compressibility and respiratory phasicity was visualized.  No calf vein thrombus. Greater  saphenous vein is patent.        Impression:      No deep venous thrombus seen in either lower extremity.    Signer Name: Donte Mahmood MD   Signed: 1/5/2023 8:01 PM   Workstation Name: [facility]    [facility]    CT Head Without Contrast [[id number]] Collected: 01/05/23 2000     Updated: 01/05/23 2002    Narrative:      CT Head WO    HISTORY:  weakness    COMPARISON: None.    TECHNIQUE: CT of the brain without IV contrast. Coronal and sagittal reconstructions were obtained.  Radiation dose reduction techniques included automated exposure control or exposure modulation based on body size. Count of known CT and cardiac nuc med  studies performed in previous 12 months: 0.     Time of Scan: 7:46 PM    FINDINGS:    No acute intracranial hemorrhage, mass effect, midline shift, or hydrocephalus.     Gray-white matter differentiation is within normal limits. Mild patchy hypodensities in the cerebral white matter, nonspecific but likely representing chronic microvascular ischemic changes. Right thalamic or capsular chronic lacunar infarct.    Ventricles and sulci are normal in size. Basal cisterns are clear.     Calvarium is intact.     Layering fluid in the left sphenoid sinus. Visualized mastoid air cells are clear.      Impression:        1.  No acute intracranial hemorrhage or mass effect.  2.  Chronic microvascular ischemic changes and generalized cerebral atrophy.  3.  Right thalamic or capsular chronic lacunar infarcts.  4.  Layering fluid in the left sphenoid sinus. Correlate with symptoms of sinusitis.    Signer Name: Ezio Boyer MD   Signed: 1/5/2023 8:00 PM   Workstation Name: [id number]    [facility]          Test Results Pending at Discharge:  Pending Labs     Order Current Status    Osmolality, Urine - Urine, Clean Catch In process    Blood Culture - Blood, Arm, Left Preliminary result    Blood Culture - Blood, Arm, Right Preliminary result          Discharge Disposition/Discharge Medications/Discharge Appointments     Discharge Disposition:   Home-Health Care Svc    Condition at Discharge:  Stable     DME Prescribed at Discharge:  Has walker and shower chair at home    Discharge Diet:  Diet Instructions     Diet: Consistent Carbohydrate      Discharge Diet: Consistent Carbohydrate          Discharge Activity:  Activity  Instructions     Activity as Tolerated            Code Status While Inpatient:  Code Status and Medical Interventions:   Ordered at: 01/06/23 0152     Level Of Support Discussed With:    Patient     Code Status (Patient has no pulse and is not breathing):    CPR (Attempt to Resuscitate)     Medical Interventions (Patient has pulse or is breathing):    Full Support       Discharge Medications:     Discharge Medications      New Medications      Instructions Start Date   cephalexin 500 MG capsule  Commonly known as: KEFLEX   500 mg, Oral, Every 6 Hours Scheduled      doxycycline 100 MG capsule  Commonly known as: MONODOX   100 mg, Oral, Every 12 Hours Scheduled      nystatin [id number] UNIT/GM powder  Commonly known as: MYCOSTATIN   Topical, 2 Times Daily, Apply to skin folds under breasts and abdomen for fungal infection. Wash with soap and water, dry well before applying         Continue These Medications      Instructions Start Date   amLODIPine 10 MG tablet  Commonly known as: NORVASC   10 mg, Oral, Daily      metoprolol tartrate 50 MG tablet  Commonly known as: LOPRESSOR   50 mg, Oral, 2 Times Daily      pravastatin 20 MG tablet  Commonly known as: PRAVACHOL   20 mg, Oral, Nightly         Stop These Medications    hydroCHLOROthiazide 25 MG tablet  Commonly known as: HYDRODIURIL            Discharge Appointments:      Additional Instructions for the Follow-ups that You Need to Schedule     Ambulatory Referral to Home Health   As directed      Face to Face Visit Date: 1/10/2023    Follow-up provider for Plan of Care?: I treated the patient in an acute care facility and will not continue treatment after discharge.    Follow-up provider: LORENA AGUERO [[id number]]    Reason/Clinical Findings: lymphedema with cellulitis    Describe mobility limitations that make leaving home difficult: generalized weakness, limited mobility due to gait instability    Nursing/Therapeutic Services Requested: Physical Therapy Occupational  "Therapy Skilled Nursing    Skilled nursing orders: Wound care dressing/changes    Instructions: compression dressing LE's for lymphedema, covering open areas from edema    PT orders: Therapeutic exercise Gait Training Strengthening Home safety assessment    Weight Bearing Status: As Tolerated    Occupational orders: Activities of daily living Energy conservation Strengthening Fine motor Home safety assessment    Frequency: 1 Week 1         Discharge Follow-up with PCP   As directed       Currently Documented PCP:    Provider, No Known    PCP Phone Number:    [phone]     Follow Up Details: 7-10 days with PCP\" Jacque Parsons [phone], STEPHANIE lymphedema and cellulitis               .Pending Labs at Discharge:  Pending Labs     Order Current Status    Osmolality, Urine - Urine, Clean Catch In process    Blood Culture - Blood, Arm, Left Preliminary result    Blood Culture - Blood, Arm, Right Preliminary result           The 10-year ASCVD risk score (Rob NICHOLSON, et al., 2019) is: 8.9%    Values used to calculate the score:      Age: 67 years      Sex: Female      Is Non- : No      Diabetic: No      Tobacco smoker: No      Systolic Blood Pressure: 134 mmHg      Is BP treated: No      HDL Cholesterol: 36 mg/dL      Total Cholesterol: 214 mg/dL     Katayoun Behbahani, MD  Hospitalist Service -- [facility]       01/10/23  12:02 EST    Discharge Time: <30 minutes      "

## 2023-01-10 NOTE — PROGRESS NOTES
[facility] Medicine Services  PROGRESS NOTE     Patient Identification:  Name:  Nathalia Landa  Age:  67 y.o.  Sex:  female  :  1955  MRN:  [id number]  Visit Number:  [id number]  Primary Care Provider:  Provider, No Known    Length of stay:  3    ----------------------------------------------------------------------------------------------------------------------  Subjective     Chief Complaint:  Follow up for LLE cellulitis, generalized weakness, CARLOS     Subjective:  Today, the patient is feeling well, asking to go home soon.  ambulating to the bathroom using a walker, she does the same at home and has a walker at home.  Reports RUE edema and pain. Lost IV access, possible infiltrated but unsure per nursing.    ----------------------------------------------------------------------------------------------------------------------  Objective     Current Hospital Meds:  amLODIPine, 10 mg, Oral, Daily  cephalexin, 500 mg, Oral, Q6H  doxycycline, 100 mg, Oral, Q12H  heparin (porcine), 5,000 Units, Subcutaneous, Q12H  Insulin Aspart, 0-7 Units, Subcutaneous, TID AC  metoprolol tartrate, 50 mg, Oral, BID  nystatin, , Topical, Q12H  pravastatin, 20 mg, Oral, Nightly  sodium chloride, 10 mL, Intravenous, Q12H  vancomycin, 1,500 mg, Intravenous, Once         ----------------------------------------------------------------------------------------------------------------------  Vital Signs:  Temp:  [97.9 °F (36.6 °C)-98.4 °F (36.9 °C)] 98.3 °F (36.8 °C)  Heart Rate:  [79-95] 80  Resp:  [18-20] 20  BP: (115-130)/(59-68) 117/59  Mean Arterial Pressure (Non-Invasive) for the past 24 hrs (Last 3 readings):   Noninvasive MAP (mmHg)   23 183 76   23 1412 83   23 1012 76     SpO2 Percentage    23 1012 23 1412 23   SpO2: 96% 96% 96%     SpO2:  [96 %-98 %] 96 %  on   ;   Device (Oxygen Therapy): room air    Body mass index is 51.47 kg/m².  Wt  Readings from Last 3 Encounters:   01/09/23 (!) 140 kg (309 lb 4.8 oz)        Intake/Output Summary (Last 24 hours) at 1/9/2023 1950  Last data filed at 1/9/2023 1616  Gross per 24 hour   Intake 660 ml   Output 3300 ml   Net -2640 ml     Diet: Renal Diets, Diabetic Diets; Consistent Carbohydrate; Low Sodium (2-3g), Low Potassium, Low Phosphorus; Texture: Regular Texture (IDDSI 7); Fluid Consistency: Thin (IDDSI 0)  ----------------------------------------------------------------------------------------------------------------------  Physical exam:   GEN: NAD, sitting up in chair  CV:irregular rhythm, normal rate, no murmur  PULM: CTA bilaterally, no wheeze or crackles  SKIN: chronic skin changes from venous stasis and lymphedema, RLE no pitting edema, LLE some pitting edema with draining broken blister  PSYCH: alert and oriented x 3, no confusion. Pleasant.  --------------------------------------------------------------------------------------------------------    Results from last 7 days   Lab Units 01/08/23  0240 01/07/23  0105 01/06/23  0453 01/06/23  0201 01/05/23  2253 01/05/23  1838   CRP mg/dL  --   --  3.49*  --   --  4.55*   LACTATE mmol/L  --   --   --  1.6 2.2*  --    WBC 10*3/mm3 12.07* 13.53* 19.44*  --   --  19.86*   HEMOGLOBIN g/dL 12.4 12.8 13.3  --   --  14.2   HEMATOCRIT % 37.5 37.6 38.8  --   --  41.4   MCV fL 86.6 84.5 83.4  --   --  83.1   MCHC g/dL 33.1 34.0 34.3  --   --  34.3   PLATELETS 10*3/mm3 300 343 373  --   --  429     Results from last 7 days   Lab Units 01/08/23  1600 01/08/23  0240 01/07/23  1907 01/07/23  0105 01/06/23  0453 01/05/23  1838   SODIUM mmol/L 125* 127* 124* 128* 120* 125*   POTASSIUM mmol/L 4.2 3.4* 3.6 3.8 4.0 4.1   MAGNESIUM mg/dL  --   --   --  2.8*  --   --    CHLORIDE mmol/L  --  96* 91* 94* 85* 84*   CO2 mmol/L  --  19.3* 21.6* 19.8* 16.4* 22.3   BUN mg/dL  --  73* 77* 89* 111* 124*   CREATININE mg/dL  --  1.29* 1.49* 1.42* 1.91* 2.37*   CALCIUM mg/dL  --  8.3*  8.7 8.5* 8.9 9.4   GLUCOSE mg/dL  --  138* 193* 137* 164* 215*   ALBUMIN g/dL  --   --   --   --  2.8* 3.4*   BILIRUBIN mg/dL  --   --   --   --  0.4 0.5   ALK PHOS U/L  --   --   --   --  163* 193*   AST (SGOT) U/L  --   --   --   --  20 15   ALT (SGPT) U/L  --   --   --   --  21 21   Estimated Creatinine Clearance: 60.3 mL/min (A) (by C-G formula based on SCr of 1.29 mg/dL (H)).  No results found for: AMMONIA    Glucose   Date/Time Value Ref Range Status   01/09/2023 1831 209 (H) 70 - 130 mg/dL Final     Comment:     Meter: [id number] : [id number] LILLIAM VAZQUEZ   01/09/2023 1011 241 (H) 70 - 130 mg/dL Final     Comment:     Meter: [id number] : [id number] kemal myers   01/09/2023 0619 131 (H) 70 - 130 mg/dL Final     Comment:     Meter: [id number] : [id number] kemal myers   01/08/2023 1953 208 (H) 70 - 130 mg/dL Final     Comment:     Meter: [id number] : [id number] ASMITA DEL ANGEL   01/08/2023 1627 161 (H) 70 - 130 mg/dL Final     Comment:     Meter: [id number] : [id number] GEORGIA FAJARDO   01/08/2023 1035 228 (H) 70 - 130 mg/dL Final     Comment:     Meter: [id number] : [id number] keiry yesica   01/08/2023 0637 151 (H) 70 - 130 mg/dL Final     Comment:     Meter: [id number] : [id number] keiry yesica   01/07/2023 1857 182 (H) 70 - 130 mg/dL Final     Comment:     RN Notified Meter: [id number] : [id number] CHANCE GAITAN     Lab Results   Component Value Date    HGBA1C 7.60 (H) 01/05/2023     Lab Results   Component Value Date    TSH 1.660 01/08/2023    FREET4 1.33 01/08/2023       Blood Culture   Date Value Ref Range Status   01/05/2023 No growth at 3 days  Preliminary   01/05/2023 No growth at 3 days  Preliminary     Urine Culture   Date Value Ref Range Status   01/05/2023 >100,000 CFU/mL Mixed Cris Isolated  Final     ----------------------------------------------------------------------------------------------------------------------  Assessment/Plan     · LLE cellulitis-  improved and nearly resolved. Likely related to chronic edema. Duplex negative for DVT. Will change IV vanc to po Doxycycline and Keflex for another 2 days to complete a 5 day course.   · RUE edema- acute, associated with bruise and pain. LUE duplex ordered. But may be related to IV infiltrating.   · Hyponatremia- seems to be stable at 125 since admission. Unclear if its chronic. No other records beyond this admission. Serum osmol, urine osmol and sodium have been ordered. TSH and FT4 are normal.   · Skin candidal infection- topical nystatin    RESOLVED MEDICAL ISSUES:   · SKI on CKD 2- cr 2.37 on admission. Has improved to 1.3, possibly BL but unclear as no prior records    CHRONIC MEDICAL PROBLEMS:   · DM2  · Morbid obesity BMI 51  · HLD- statin  · HTN- on Norvasc and Metoprolol at [facility] and here. Home HCTZ held as can be contributing to hyponatremia. However, no change noted here.     --------------------------------------------------  DVT Prophylaxis: hep sq     Disposition: likely d/c home tomorrow  --------------------------------------------------      Katayoun Behbahani, MD  Hospitalist Service -- [facility]     01/09/23  19:50 EST

## 2023-01-10 NOTE — CASE MANAGEMENT/SOCIAL WORK
Discharge Planning Assessment   [facility]     Patient Name: Nathalia Landa  MRN: [id number]  Today's Date: 1/10/2023    Admit Date: 1/5/2023    Plan: Pt resides at home with 16 year old Grandson and Son and Dtr in Law residing next door and plans to return home at discharge.  Pt currently does not utilize home health serivces.  Pt requests home health services at discharge.  Pt currently utilizes rolling walker.  SS will follow and assist with discharge disposition.     Discharge Plan     Row Name 01/10/23 1310       Plan    Final Discharge Disposition Code 01 - home or self-care    Final Note SS noted Physician order for home health services.  Pt resides in [facility].  Neither [facility] or [facility] will accept pt under pt's current insurance.  Pt is aware and agreeable.   notified Physician.           JEREMÍAS ZepedaW

## 2023-01-10 NOTE — PLAN OF CARE
Goal Outcome Evaluation:  Plan of Care Reviewed With: patient           Outcome Evaluation: Pt resting in bed. Remains on room air, sats above 90%. VSS. No s/s of acute distress noted.

## 2023-01-11 ENCOUNTER — READMISSION MANAGEMENT (OUTPATIENT)
Dept: CALL CENTER | Facility: HOSPITAL | Age: 68
End: 2023-01-11
Payer: MEDICARE

## 2023-01-11 NOTE — OUTREACH NOTE
Prep Survey    Flowsheet Row Responses   [facility] facility patient discharged from? [facility]   Is LACE score < 7 ? No   Eligibility Readm Mgmt   Discharge diagnosis  Left upper leg/thigh cellulitis   Does the patient have one of the following disease processes/diagnoses(primary or secondary)? Other   Does the patient have Home health ordered? Yes   What is the Home health agency?  [facility]   Is there a DME ordered? No   Prep survey completed? Yes          BRITTA ANGELA - Registered Nurse

## 2023-01-11 NOTE — PAYOR COMM NOTE
"[facility]  NPI:[id number]    Utilization Review  Contact: Tatiana Varela RN  Phone: [phone]  Fax:[phone]    DISCHARGE NOTIFICATION    [id number]    Nathalia Santos (67 y.o. Female)     Date of Birth   1955    Social Security Number       Address   [address] [place] [address]    Home Phone   [phone]    MRN   [id number]       Christianity   None    Marital Status   Unknown                            Admission Date   1/5/23    Admission Type   Emergency    Admitting Provider   Bib Parsons MD    Attending Provider       Department, Room/Bed   [facility], 3313/1S       Discharge Date   1/10/2023    Discharge Disposition   Home-Health Care [facility]    Discharge Destination                               Attending Provider: (none)   Allergies: No Known Allergies    Isolation: None   Infection: None   Code Status: Prior    Ht: 165.1 cm (65\")   Wt: 140 kg (309 lb 4.8 oz)    Admission Cmt: None   Principal Problem: Cellulitis of lower extremity [L03.119]                 Active Insurance as of 1/5/2023     Primary Coverage     Payor Plan Insurance Group Employer/Plan Group    WELLCARE [facility] MEDICARE REPLACEMENT WELLCARE MEDICARE REPLACEMENT       Payor Plan Address Payor Plan Phone Number Payor Plan Fax Number Effective Dates    [address] [address] [phone]  11/1/2021 - None Entered    [facility] [id number]       Subscriber Name Subscriber Birth Date Member ID       NATHALIA SANTOS 1955 [id number]           Secondary Coverage     Payor Plan Insurance Group Employer/Plan Group    [id number]      Payor Plan Address Payor Plan Phone Number Payor Plan Fax Number Effective Dates    [address]   1/5/2023 - None Entered    [facility] [place] [address]       Subscriber Name Subscriber Birth Date Member ID       NATHALIA SANTOS 1955 [id number]                 Emergency Contacts          No emergency contacts on file.               Discharge " Summary      Behbahani, Katayoun, MD at 01/10/23 1202              [facility]  DISCHARGE SUMMARY    Patient Identification:  Name:  Nathalia Landa  Age:  67 y.o.  Sex:  female  :  1955  MRN:  [id number]  Visit Number:  [id number]    Date of Admission: 2023  Date of Discharge:  1/10/2023    PCP: Provider, No Known      Admission/Discharge Diagnoses     Discharge Diagnoses:  · LLE cellulitis   · Hypovolemic hyponatremia  · Skin fold candidiasis  · Chronic lymphedema needing compression dressing    Consults/Procedures     Consults:   Consults     Date and Time Order Name Status Description    2023  8:16 AM Inpatient General Surgery Consult            Procedures Performed:  none    Hospital Course      Nathalia Landa was admitted for left lower extremity cellulitis. She was started on IV vancomycin with improvement of cellulitis. Antibiotic was changed to oral regimen yesterday to Keflex and Doxycycline for additional 2 days to complete a 5 day course. Her erythema has improved significantly. She had small blisters that had ruptured and leaking fluid due to edema.  She has chronic lymphedema, likely cause of cellulitis.  Will need compression dressing to control edema.  Will need f/u with PCP in 7-10 days to send referral for compression dressing. In the meantime will need HH nursing for dressing changes as needed and monitor for worsening cellulitis or wounds and to assist with compression dressing.   She was also found to have hyponatremia (120) and noted to be due to volume depletion. Her HCTZ was held, she received IVF. Her urine sodium was <20 with elevated serum osmol, diagnostic for volume depletion.  Sodium has improved to 132 today (corrected over the past several days since admission). Therefore, I have discontinued her home HCTZ as causing intravascular volume depletion.  Only effective lymphedema treatment is LE compression dressing and manual  lymph  drainage if needed. Defer to PCP to arrange as outpatient. Her blood pressure has been in normal rage here and not needed additional treatment. Can continue home Norvasc and Metoprolol.     Discharge Vitals/Physical Examination     Vital Signs:  Temp:  [97.9 °F (36.6 °C)-98.3 °F (36.8 °C)] 98 °F (36.7 °C)  Heart Rate:  [] 101  Resp:  [20] 20  BP: (114-134)/(54-63) 134/63  Mean Arterial Pressure (Non-Invasive) for the past 24 hrs (Last 3 readings):   Noninvasive MAP (mmHg)   01/10/23 1019 82   01/10/23 0155 74   01/09/23 1832 76     SpO2 Percentage    01/10/23 0620 01/10/23 0845 01/10/23 1019   SpO2: 96% 98% 97%     SpO2:  [95 %-98 %] 97 %  on   ;   Device (Oxygen Therapy): room air    Body mass index is 51.47 kg/m².  Wt Readings from Last 3 Encounters:   01/09/23 (!) 140 kg (309 lb 4.8 oz)         Physical Exam:  GEN: NAD  EYES:  HENT:  CV: RRR, no murmur  PULM: CTA, no wheeze or crackles  SKIN: + lymphedema BLE's, LLE erythema cont to improve. RUE edema has resolved (infiltrated IV, negative duplex for DVT)  PSYCH: alert and oriented x 4, no confusion. Pleasant.     Pertinent Laboratory/Radiology Results     Pertinent Laboratory Results:  Results from last 7 days   Lab Units 01/05/23  1838   TROPONIN T ng/mL <0.010     Results from last 7 days   Lab Units 01/05/23  1838   PROBNP pg/mL 164.0     Results from last 7 days   Lab Units 01/06/23  0453   CHOLESTEROL mg/dL 214*   TRIGLYCERIDES mg/dL 165*   HDL CHOL mg/dL 36*   LDL CHOL mg/dL 148*       Results from last 7 days   Lab Units 01/08/23  0240 01/07/23  0105 01/06/23  0453 01/06/23  0201 01/05/23  2253 01/05/23  1838   CRP mg/dL  --   --  3.49*  --   --  4.55*   LACTATE mmol/L  --   --   --  1.6 2.2*  --    WBC 10*3/mm3 12.07* 13.53* 19.44*  --   --  19.86*   HEMOGLOBIN g/dL 12.4 12.8 13.3  --   --  14.2   HEMATOCRIT % 37.5 37.6 38.8  --   --  41.4   MCV fL 86.6 84.5 83.4  --   --  83.1   MCHC g/dL 33.1 34.0 34.3  --   --  34.3   PLATELETS 10*3/mm3 300 343  373  --   --  429     Results from last 7 days   Lab Units 01/10/23  0131 01/08/23  1600 01/08/23  0240 01/07/23  1907 01/07/23  0105 01/06/23  0453 01/05/23  1838   SODIUM mmol/L 132* 125* 127* 124* 128* 120* 125*   POTASSIUM mmol/L 3.9 4.2 3.4* 3.6 3.8 4.0 4.1   MAGNESIUM mg/dL  --   --   --   --  2.8*  --   --    CHLORIDE mmol/L 98  --  96* 91* 94* 85* 84*   CO2 mmol/L 22.0  --  19.3* 21.6* 19.8* 16.4* 22.3   BUN mg/dL 40*  --  73* 77* 89* 111* 124*   CREATININE mg/dL 1.08*  --  1.29* 1.49* 1.42* 1.91* 2.37*   CALCIUM mg/dL 8.8  --  8.3* 8.7 8.5* 8.9 9.4   GLUCOSE mg/dL 146*  --  138* 193* 137* 164* 215*   ALBUMIN g/dL  --   --   --   --   --  2.8* 3.4*   BILIRUBIN mg/dL  --   --   --   --   --  0.4 0.5   ALK PHOS U/L  --   --   --   --   --  163* 193*   AST (SGOT) U/L  --   --   --   --   --  20 15   ALT (SGPT) U/L  --   --   --   --   --  21 21   Estimated Creatinine Clearance: 72 mL/min (A) (by C-G formula based on SCr of 1.08 mg/dL (H)).  No results found for: AMMONIA    Glucose   Date/Time Value Ref Range Status   01/10/2023 1012 223 (H) 70 - 130 mg/dL Final     Comment:     Meter: [id number] : [id number] kemal myers   01/10/2023 0632 143 (H) 70 - 130 mg/dL Final     Comment:     Meter: [id number] : [id number] kemal myers   01/09/2023 1831 209 (H) 70 - 130 mg/dL Final     Comment:     Meter: [id number] : [id number] LILLIAM TAYLOR   01/09/2023 1011 241 (H) 70 - 130 mg/dL Final     Comment:     Meter: [id number] : [id number] kemal myers   01/09/2023 0619 131 (H) 70 - 130 mg/dL Final     Comment:     Meter: [id number] : [id number] kemal myers   01/08/2023 1953 208 (H) 70 - 130 mg/dL Final     Comment:     Meter: [id number] : [id number] ASMITA DEL ANGEL   01/08/2023 1627 161 (H) 70 - 130 mg/dL Final     Comment:     Meter: [id number] : [id number] GEORGIA FAJARDO   01/08/2023 1035 228 (H) 70 - 130 mg/dL Final     Comment:     Meter: [id number] : [id number] keiry robert      Lab Results   Component Value Date    HGBA1C 7.60 (H) 01/05/2023     Lab Results   Component Value Date    TSH 1.660 01/08/2023    FREET4 1.33 01/08/2023       Blood Culture   Date Value Ref Range Status   01/05/2023 No growth at 4 days  Preliminary   01/05/2023 No growth at 4 days  Preliminary     Urine Culture   Date Value Ref Range Status   01/05/2023 >100,000 CFU/mL Mixed Cris Isolated  Final       Microbiology Results (last 10 days)     Procedure Component Value - Date/Time    Blood Culture - Blood, Arm, Left [[id number]]  (Normal) Collected: 01/05/23 2253    Lab Status: Preliminary result Specimen: Blood from Arm, Left Updated: 01/09/23 2300     Blood Culture No growth at 4 days    Blood Culture - Blood, Arm, Right [[id number]]  (Normal) Collected: 01/05/23 2253    Lab Status: Preliminary result Specimen: Blood from Arm, Right Updated: 01/09/23 2300     Blood Culture No growth at 4 days    Urine Culture - Urine, Suprapubic Catheter [[id number]] Collected: 01/05/23 2218    Lab Status: Final result Specimen: Urine from Suprapubic Catheter Updated: 01/07/23 0942     Urine Culture >100,000 CFU/mL Mixed Cris Isolated    Narrative:      Specimen contains mixed organisms of questionable pathogenicity suggestive of contamination. If symptoms persist, suggest recollection.  Colonization of the urinary tract without infection is common. Treatment is discouraged unless the patient is symptomatic, pregnant, or undergoing an invasive urologic procedure.    COVID-19 and FLU A/B PCR - Swab, Nasopharynx [[id number]]  (Normal) Collected: 01/05/23 2132    Lab Status: Final result Specimen: Swab from Nasopharynx Updated: 01/05/23 2157     COVID19 Not Detected     Influenza A PCR Not Detected     Influenza B PCR Not Detected    Narrative:      Fact sheet for providers: https://www.fda.gov/media/638986/download    Fact sheet for patients: https://www.fda.gov/media/232295/download    Test performed by PCR.        Pain Management  Panel    There is no flowsheet data to display.         Pertinent Radiology Results:  Imaging Results (All)     Procedure Component Value Units Date/Time    US Venous Doppler Upper Extremity Right (duplex) [[id number]] Collected: 01/10/23 0744     Updated: 01/10/23 0746    Narrative:      EXAMINATION: US VENOUS DOPPLER UPPER EXTREMITY RIGHT (DUPLEX)-      CLINICAL INDICATION: edema; N17.9-Acute kidney failure, unspecified;  L03.90-Cellulitis, unspecified; R53.1-Weakness        COMPARISON: None available     PROCEDURE:  Color Doppler imaging with compression and augmentation to evaluate the  right upper extremity deep venous system.     FINDINGS:  There is no internal clot or area of noncompressibility in the jugular,  subclavian, axillary, brachial, radial, or ulnar veins     Normal augmentation elicited where applicable.       Impression:      No DVT in the right upper extremity      This report was finalized on 1/10/2023 7:44 AM by Dr. Lawrence Cortez MD.       US Renal Bilateral [[id number]] Collected: 01/07/23 1105     Updated: 01/07/23 1107    Narrative:      EXAMINATION: US RENAL BILATERAL-      CLINICAL INDICATION: look for evidence of CKD; N17.9-Acute kidney  failure, unspecified; L03.90-Cellulitis, unspecified; R53.1-Weakness        COMPARISON: None available     PROCEDURE: Sonographic imaging of the kidneys     FINDINGS:  Imaging of the right kidney demonstrates the right kidney measuring  10.54 x 4.69 cm. No solid mass or hydronephrosis.     Left kidney measures 10.61 x 4.61 cm. No solid mass or hydronephrosis       Impression:      1. Unremarkable sonographic appearance of the kidneys.     This report was finalized on 1/7/2023 11:05 AM by Dr. Lawrence Cortez MD.       XR Tibia Fibula 2 View Right [[id number]] Collected: 01/05/23 2228     Updated: 01/05/23 2230    Narrative:      CR Tibia Fibula 2 Vws RT    INDICATION:   Leg swelling.    COMPARISON:   None available.    FINDINGS:   AP and lateral views of the  right tibia/fibula.     Subcutaneous soft tissue edema. No fracture or dislocation. Tricompartmental knee osteoarthrosis, most advanced in the medial compartment. Visualized portions of the ankle joints are in anatomic alignment. No foreign body.      Impression:      1.  Subcutaneous edema. No acute osseous abnormality.  2.  Tricompartmental knee osteoarthrosis.    Signer Name: Ezio Boyer MD   Signed: 1/5/2023 10:28 PM   Workstation Name: [id number]    [facility]    XR Femur 2 View Left [[id number]] Collected: 01/05/23 2227     Updated: 01/05/23 2229    Narrative:      CR Femur 2 Vws LT    INDICATION:   left leg eyrthema and pain    COMPARISON:   None available.    FINDINGS:   AP and lateral views of the left femur.      Evaluation of the proximal femur is limited due to patient body habitus. No fracture or dislocation. Tricompartmental osteoarthrosis of the knee.  No foreign body.      Impression:      1.  Evaluation of the proximal femur is limited due to patient body habitus. No acute osseous findings.  2.  Tricompartmental knee osteoarthrosis.    Signer Name: Ezio Boyer MD   Signed: 1/5/2023 10:27 PM   Workstation Name: [id number]    [facility]    XR Tibia Fibula 2 View Left [[id number]] Collected: 01/05/23 2226     Updated: 01/05/23 2228    Narrative:      CR Tibia Fibula 2 Vws LT    INDICATION:   Erythema.    COMPARISON:   None available.    FINDINGS:   AP and lateral views of the left tibia/fibula.      Subcutaneous soft tissue edema. No fracture or dislocation.  Tricompartmental osteoarthrosis, most advanced in the medial compartment. Visualized ankle joint is anatomic alignment. Plantar calcaneal spur. No foreign body.      Impression:      1.  No fracture or dislocation.  2.  Subcutaneous soft tissue edema.  3.  Tricompartmental knee osteoarthrosis.    Signer Name: Ezio Boyer MD   Signed: 1/5/2023 10:26 PM   Workstation Name: [id number]    [facility]    XR Chest 1 View [[id number]] Collected: 01/05/23 2004     Updated: 01/05/23 2006    Narrative:      CR Chest 1 Vw    INDICATION:   Bilateral lower extremity swelling     COMPARISON:    July 7, 2007    FINDINGS:  Portable AP view(s) of the chest.    The heart size is enlarged. Mediastinal structures are midline. Pulmonary vascularity is normal. The lungs are clear. No pleural fluid. No pneumothorax.      Impression:      Cardiomegaly. No acute infiltrates.    Signer Name: Donte Mahmood MD   Signed: 1/5/2023 8:04 PM   Workstation [facility]: [facility]    [facility]    US Venous Doppler Lower Extremity Bilateral (duplex) [[id number]] Collected: 01/05/23 2001     Updated: 01/05/23 2003    Narrative:      US Veins LE Duplex BILAT    HISTORY:   Bilateral leg swelling and pain    TECHNIQUE:   Real-time ultrasound was performed of both lower extremities utilizing spectral and color Doppler with compression and augmentation techniques.    COMPARISON:  None available.    FINDINGS:  Right Lower Extremity:  There is no deep venous thrombus seen in the right lower extremity, including the right common femoral veins, right femoral veins and right popliteal veins.  Normal compressibility and respiratory phasicity was visualized.  No calf vein thrombus. Greater  saphenous vein is patent.    Left Lower Extremity:  There is no deep venous thrombus seen in the left lower extremity, including the left common femoral veins, left femoral veins and left popliteal veins.   Normal compressibility and respiratory phasicity was visualized.  No calf vein thrombus. Greater  saphenous vein is patent.        Impression:      No deep venous thrombus seen in either lower extremity.    Signer Name: Donte Mahmood MD   Signed: 1/5/2023 8:01 PM   Workstation Name: [email or web address]    [facility]    CT Head Without Contrast [[id number]] Collected: 01/05/23 2000     Updated: 01/05/23 2002     Narrative:      CT Head WO    HISTORY: weakness    COMPARISON: None.    TECHNIQUE: CT of the brain without IV contrast. Coronal and sagittal reconstructions were obtained.  Radiation dose reduction techniques included automated exposure control or exposure modulation based on body size. Count of known CT and cardiac nuc med  studies performed in previous 12 months: 0.     Time of Scan: 7:46 PM    FINDINGS:    No acute intracranial hemorrhage, mass effect, midline shift, or hydrocephalus.     Gray-white matter differentiation is within normal limits. Mild patchy hypodensities in the cerebral white matter, nonspecific but likely representing chronic microvascular ischemic changes. Right thalamic or capsular chronic lacunar infarct.    Ventricles and sulci are normal in size. Basal cisterns are clear.     Calvarium is intact.     Layering fluid in the left sphenoid sinus. Visualized mastoid air cells are clear.      Impression:        1.  No acute intracranial hemorrhage or mass effect.  2.  Chronic microvascular ischemic changes and generalized cerebral atrophy.  3.  Right thalamic or capsular chronic lacunar infarcts.  4.  Layering fluid in the left sphenoid sinus. Correlate with symptoms of sinusitis.    Signer Name: Ezio Boyer MD   Signed: 1/5/2023 8:00 PM   Workstation Name: [id number]    [facility]          Test Results Pending at Discharge:  Pending Labs     Order Current Status    Osmolality, Urine - Urine, Clean Catch In process    Blood Culture - Blood, Arm, Left Preliminary result    Blood Culture - Blood, Arm, Right Preliminary result          Discharge Disposition/Discharge Medications/Discharge Appointments     Discharge Disposition:   Home-Health Care [facility]    Condition at Discharge:  Stable     DME Prescribed at Discharge:  Has walker and shower chair at home    Discharge Diet:  Diet Instructions     Diet: Consistent Carbohydrate      Discharge Diet: Consistent Carbohydrate           Discharge Activity:  Activity Instructions     Activity as Tolerated            Code Status While Inpatient:  Code Status and Medical Interventions:   Ordered at: 01/06/23 0152     Level Of Support Discussed With:    Patient     Code Status (Patient has no pulse and is not breathing):    CPR (Attempt to Resuscitate)     Medical Interventions (Patient has pulse or is breathing):    Full Support       Discharge Medications:     Discharge Medications      New Medications      Instructions Start Date   cephalexin 500 MG capsule  Commonly known as: KEFLEX   500 mg, Oral, Every 6 Hours Scheduled      doxycycline 100 MG capsule  Commonly known as: MONODOX   100 mg, Oral, Every 12 Hours Scheduled      nystatin 322241 UNIT/GM powder  Commonly known as: MYCOSTATIN   Topical, 2 Times Daily, Apply to skin folds under breasts and abdomen for fungal infection. Wash with soap and water, dry well before applying         Continue These Medications      Instructions Start Date   amLODIPine 10 MG tablet  Commonly known as: NORVASC   10 mg, Oral, Daily      metoprolol tartrate 50 MG tablet  Commonly known as: LOPRESSOR   50 mg, Oral, 2 Times Daily      pravastatin 20 MG tablet  Commonly known as: PRAVACHOL   20 mg, Oral, Nightly         Stop These Medications    hydroCHLOROthiazide 25 MG tablet  Commonly known as: HYDRODIURIL            Discharge Appointments:      Additional Instructions for the Follow-ups that You Need to Schedule     Ambulatory Referral to Home Health   As directed      Face to Face Visit Date: 1/10/2023    Follow-up provider for Plan of Care?: I treated the patient in an acute care facility and will not continue treatment after discharge.    Follow-up provider: LORENA AGUERO [[id number]]    Reason/Clinical Findings: lymphedema with cellulitis    Describe mobility limitations that make leaving home difficult: generalized weakness, limited mobility due to gait instability    Nursing/Therapeutic Services  "Requested: Physical Therapy Occupational Therapy Skilled Nursing    Skilled nursing orders: Wound care dressing/changes    Instructions: compression dressing LE's for lymphedema, covering open areas from edema    PT orders: Therapeutic exercise Gait Training Strengthening Home safety assessment    Weight Bearing Status: As Tolerated    Occupational orders: Activities of daily living Energy conservation Strengthening Fine motor Home safety assessment    Frequency: 1 Week 1         Discharge Follow-up with PCP   As directed       Currently Documented PCP:    Provider, No Known    PCP Phone Number:    [phone]     Follow Up Details: 7-10 days with PCP\" Jacque Parsons [phone], LE lymphedema and cellulitis               .Pending Labs at Discharge:  Pending Labs     Order Current Status    Osmolality, Urine - Urine, Clean Catch In process    Blood Culture - Blood, Arm, Left Preliminary result    Blood Culture - Blood, Arm, Right Preliminary result           The 10-year ASCVD risk score (Rob NICHOLSON, et al., 2019) is: 8.9%    Values used to calculate the score:      Age: 67 years      Sex: Female      Is Non- : No      Diabetic: No      Tobacco smoker: No      Systolic Blood Pressure: 134 mmHg      Is BP treated: No      HDL Cholesterol: 36 mg/dL      Total Cholesterol: 214 mg/dL     Katayoun Behbahani, MD  Hospitalist Service -- [facility]       01/10/23  12:02 EST    Discharge Time: <30 minutes        Electronically signed by Behbahani, Katayoun, MD at 01/10/23 1214       "

## 2023-01-16 NOTE — PROGRESS NOTES
Enter Query Response Below      Query Response:     Unable to determine         If applicable, please update the problem list.        Patient: Nathalia Landa        : 1955  Account: [id number]           Admit Date: 2023        How to Respond to this query:       a. Click New Note     b. Answer query within the yellow box.                c. Update the Problem List, if applicable.      If you have any questions about this query contact me at: [email or web address]    Dr. Carpio,    67 yr female noted with documentation of mild lactic acidosis in progress notes. Lactate 2.2 on admit with IV NS bolus given. Lactic acidosis 1.6 noted on 23.    After study, can the diagnosis of lactic acidosis be further specified as:    Lactic acidosis-clinically significant  Lactic acidosis-clinically insignificant  Other (please specify)_________  Unable to determine      By submitting this query, we are merely seeking further clarification of documentation to accurately reflect all conditions that you are monitoring, evaluating, treating or that extend the hospitalization or utilize additional resources of care. Please utilize your independent clinical judgment when addressing the question(s) above.     This query and your response, once completed, will be entered into the legal medical record.    Sincerely,  Dayana WILSON Rn   Clinical Documentation Integrity Program

## 2023-01-17 ENCOUNTER — READMISSION MANAGEMENT (OUTPATIENT)
Dept: CALL CENTER | Facility: HOSPITAL | Age: 68
End: 2023-01-17
Payer: MEDICARE

## 2023-01-17 NOTE — OUTREACH NOTE
Medical Week 1 Survey    Flowsheet Row Responses   [facility] patient discharged from? [facility]   Does the patient have one of the following disease processes/diagnoses(primary or secondary)? Other   Week 1 attempt successful? Yes   Call start time 1000   Call end time 1005   Discharge diagnosis  Left upper leg/thigh cellulitis   Meds reviewed with patient/caregiver? Yes   Is the patient having any side effects they believe may be caused by any medication additions or changes? No   Does the patient have all medications ordered at discharge? Yes   Is the patient taking all medications as directed (includes completed medication regime)? Yes   Comments regarding appointments Pt is unsure that she can make it to f/u appt 1-18-23 due to her being weak. She is ambulating independently in house, she reports. Educated on importance of keeping appt especially since she reports no improvement in LE.    Does the patient have a primary care provider?  Yes   Does the patient have an appointment with their PCP within 7 days of discharge? Yes   Has the patient kept scheduled appointments due by today? N/A   Has home health visited the patient within 72 hours of discharge? No   Home health comments CM reports that neither  agencies will accept r/t ins.   Psychosocial issues? No   Comments LLE edema remains, still weeping clear fluid, no redness/heat.No dressing in place as pt can't get to the area to do so.Pt reports no issues w/ N/V/D or voiding and appetite is WNL.Pt c/o weakness.   Did the patient receive a copy of their discharge instructions? Yes   Nursing interventions Reviewed instructions with patient   What is the patient's perception of their health status since discharge? Same   Is the patient/caregiver able to teach back signs and symptoms related to disease process for when to call PCP? Yes   Is the patient/caregiver able to teach back signs and symptoms related to disease process for when to call 911? Yes   Is  the patient/caregiver able to teach back the hierarchy of who to call/visit for symptoms/problems? PCP, Specialist, Home health nurse, Urgent Care, ED, 911 Yes   If the patient is a current smoker, are they able to teach back resources for cessation? Not a smoker   Week 1 call completed? Yes   Is the patient interested in additional calls from an ambulatory ?  NOTE:  applies to high risk patients requiring additional follow-up. Toyin RODRÍGUEZ - Registered Nurse

## 2023-01-23 ENCOUNTER — READMISSION MANAGEMENT (OUTPATIENT)
Dept: CALL CENTER | Facility: HOSPITAL | Age: 68
End: 2023-01-23
Payer: MEDICARE

## 2023-01-23 NOTE — OUTREACH NOTE
"Medical Week 2 Survey    Flowsheet Row Responses   [facility] patient discharged from? [facility]   Does the patient have one of the following disease processes/diagnoses(primary or secondary)? Other   Week 2 attempt successful? Yes   Call start time 1328   Discharge diagnosis  Left upper leg/thigh cellulitis   Call end time 1333   Meds reviewed with patient/caregiver? Yes   Is the patient having any side effects they believe may be caused by any medication additions or changes? No   Does the patient have all medications ordered at discharge? Yes   Is the patient taking all medications as directed (includes completed medication regime)? Yes   Does the patient have a primary care provider?  Yes   Does the patient have an appointment with their PCP within 7 days of discharge? Yes   Has the patient kept scheduled appointments due by today? No  [had to reschedule appt]   What is preventing the patient from keeping their appointments? Uncomfortable attending in person appointment   Nursing Interventions Advised to reschedule appointment   What is the Home health agency?  [facility]   Has home health visited the patient within 72 hours of discharge? No   Home health comments insurance not covered   Psychosocial issues? No   Did the patient receive a copy of their discharge instructions? Yes   Nursing interventions Reviewed instructions with patient   What is the patient's perception of their health status since discharge? Improving   Is the patient/caregiver able to teach back signs and symptoms related to disease process for when to call PCP? Yes   Is the patient/caregiver able to teach back signs and symptoms related to disease process for when to call 911? Yes   Is the patient/caregiver able to teach back the hierarchy of who to call/visit for symptoms/problems? PCP, Specialist, Home health nurse, Urgent Care, ED, 911 Yes   Additional teach back comments states leg edema is decreasing, still \"weeping\" at the " bottom, encouraged pt to reschedule appt   Week 2 Call Completed? Yes          SONA MARES - Registered Nurse

## 2023-02-01 ENCOUNTER — READMISSION MANAGEMENT (OUTPATIENT)
Dept: CALL CENTER | Facility: HOSPITAL | Age: 68
End: 2023-02-01
Payer: MEDICARE

## 2023-02-01 NOTE — OUTREACH NOTE
Medical Week 3 Survey    Flowsheet Row Responses   [facility] [place] patient discharged from? [facility]   Does the patient have one of the following disease processes/diagnoses(primary or secondary)? Other   Week 3 attempt successful? Yes   Call start time 1217   Call end time 1221   Discharge diagnosis  Left upper leg/thigh cellulitis   Meds reviewed with patient/caregiver? Yes   Is the patient taking all medications as directed (includes completed medication regime)? Yes   Comments regarding PCP [facility]   Has the patient kept scheduled appointments due by today? No   What is preventing the patient from keeping their appointments? Doesn't understand importance   Nursing Interventions Advised patient to keep appointment, Educated on importance of keeping appointment, Advised to reschedule appointment   Home health comments insurance not covered   Psychosocial comments has no family to care for her.   Comments LLE continues to weep clear fluid but denies redness/edema or hot to touch. No dressings in place as HH is not available to her. Pt c/o constant nausea, able to eat small amts. No issues voiding. Pt uses walker for ambulation.   What is the patient's perception of their health status since discharge? Same   Is the patient/caregiver able to teach back signs and symptoms related to disease process for when to call PCP? Yes   Week 3 Call Completed? Yes          MARIAH RODRÍGUEZ - Registered Nurse

## 2023-02-03 ENCOUNTER — APPOINTMENT (OUTPATIENT)
Dept: CT IMAGING | Facility: HOSPITAL | Age: 68
End: 2023-02-03
Payer: MEDICARE

## 2023-02-03 ENCOUNTER — HOSPITAL ENCOUNTER (EMERGENCY)
Facility: HOSPITAL | Age: 68
Discharge: ANOTHER HOSPITAL | End: 2023-02-04
Attending: EMERGENCY MEDICINE | Admitting: EMERGENCY MEDICINE
Payer: MEDICARE

## 2023-02-03 ENCOUNTER — APPOINTMENT (OUTPATIENT)
Dept: GENERAL RADIOLOGY | Facility: HOSPITAL | Age: 68
End: 2023-02-03
Payer: MEDICARE

## 2023-02-03 DIAGNOSIS — N17.9 AKI (ACUTE KIDNEY INJURY): Primary | ICD-10-CM

## 2023-02-03 DIAGNOSIS — E87.1 HYPONATREMIA: ICD-10-CM

## 2023-02-03 DIAGNOSIS — A41.9 SEPSIS WITH ACUTE RENAL FAILURE WITHOUT SEPTIC SHOCK, DUE TO UNSPECIFIED ORGANISM, UNSPECIFIED ACUTE RENAL FAILURE TYPE: ICD-10-CM

## 2023-02-03 DIAGNOSIS — I45.9 HEART BLOCK: ICD-10-CM

## 2023-02-03 DIAGNOSIS — E87.5 HYPERKALEMIA: ICD-10-CM

## 2023-02-03 DIAGNOSIS — N30.01 ACUTE CYSTITIS WITH HEMATURIA: ICD-10-CM

## 2023-02-03 DIAGNOSIS — I50.9 ACUTE CONGESTIVE HEART FAILURE, UNSPECIFIED HEART FAILURE TYPE: ICD-10-CM

## 2023-02-03 DIAGNOSIS — N17.9 SEPSIS WITH ACUTE RENAL FAILURE WITHOUT SEPTIC SHOCK, DUE TO UNSPECIFIED ORGANISM, UNSPECIFIED ACUTE RENAL FAILURE TYPE: ICD-10-CM

## 2023-02-03 DIAGNOSIS — R65.20 SEPSIS WITH ACUTE RENAL FAILURE WITHOUT SEPTIC SHOCK, DUE TO UNSPECIFIED ORGANISM, UNSPECIFIED ACUTE RENAL FAILURE TYPE: ICD-10-CM

## 2023-02-03 LAB
A-A DO2: 13.1 MMHG (ref 0–300)
ALBUMIN SERPL-MCNC: 3.6 G/DL (ref 3.5–5.2)
ALBUMIN/GLOB SERPL: 0.7 G/DL
ALP SERPL-CCNC: 159 U/L (ref 39–117)
ALT SERPL W P-5'-P-CCNC: 22 U/L (ref 1–33)
ANION GAP SERPL CALCULATED.3IONS-SCNC: 23.4 MMOL/L (ref 5–15)
ANION GAP SERPL CALCULATED.3IONS-SCNC: 23.5 MMOL/L (ref 5–15)
APTT PPP: 35.1 SECONDS (ref 26.5–34.5)
ARTERIAL PATENCY WRIST A: ABNORMAL
AST SERPL-CCNC: 17 U/L (ref 1–32)
ATMOSPHERIC PRESS: 737 MMHG
BACTERIA UR QL AUTO: ABNORMAL /HPF
BASE EXCESS BLDA CALC-SCNC: -14.6 MMOL/L (ref 0–2)
BASOPHILS # BLD AUTO: 0.03 10*3/MM3 (ref 0–0.2)
BASOPHILS NFR BLD AUTO: 0.1 % (ref 0–1.5)
BDY SITE: ABNORMAL
BILIRUB SERPL-MCNC: 0.6 MG/DL (ref 0–1.2)
BILIRUB UR QL STRIP: ABNORMAL
BODY TEMPERATURE: 0 C
BUN SERPL-MCNC: 175 MG/DL (ref 8–23)
BUN SERPL-MCNC: 177 MG/DL (ref 8–23)
BUN/CREAT SERPL: 23.3 (ref 7–25)
BUN/CREAT SERPL: 23.6 (ref 7–25)
CALCIUM SPEC-SCNC: 7.7 MG/DL (ref 8.6–10.5)
CALCIUM SPEC-SCNC: 8.6 MG/DL (ref 8.6–10.5)
CHLORIDE SERPL-SCNC: 75 MMOL/L (ref 98–107)
CHLORIDE SERPL-SCNC: 81 MMOL/L (ref 98–107)
CK SERPL-CCNC: 32 U/L (ref 20–180)
CLARITY UR: ABNORMAL
CO2 BLDA-SCNC: 11.7 MMOL/L (ref 22–33)
CO2 SERPL-SCNC: 11.5 MMOL/L (ref 22–29)
CO2 SERPL-SCNC: 11.6 MMOL/L (ref 22–29)
COHGB MFR BLD: 0.9 % (ref 0–5)
COLOR UR: ABNORMAL
CREAT SERPL-MCNC: 7.5 MG/DL (ref 0.57–1)
CREAT SERPL-MCNC: 7.51 MG/DL (ref 0.57–1)
CRP SERPL-MCNC: 4.82 MG/DL (ref 0–0.5)
D-LACTATE SERPL-SCNC: 1.8 MMOL/L (ref 0.5–2)
DEPRECATED RDW RBC AUTO: 53.2 FL (ref 37–54)
EGFRCR SERPLBLD CKD-EPI 2021: 5.5 ML/MIN/1.73
EGFRCR SERPLBLD CKD-EPI 2021: 5.5 ML/MIN/1.73
EOSINOPHIL # BLD AUTO: 0.01 10*3/MM3 (ref 0–0.4)
EOSINOPHIL NFR BLD AUTO: 0 % (ref 0.3–6.2)
ERYTHROCYTE [DISTWIDTH] IN BLOOD BY AUTOMATED COUNT: 17.8 % (ref 12.3–15.4)
FLUAV RNA RESP QL NAA+PROBE: NOT DETECTED
FLUBV RNA RESP QL NAA+PROBE: NOT DETECTED
GLOBULIN UR ELPH-MCNC: 5.1 GM/DL
GLUCOSE BLDC GLUCOMTR-MCNC: 233 MG/DL (ref 70–130)
GLUCOSE SERPL-MCNC: 254 MG/DL (ref 65–99)
GLUCOSE SERPL-MCNC: 353 MG/DL (ref 65–99)
GLUCOSE UR STRIP-MCNC: NEGATIVE MG/DL
HCO3 BLDA-SCNC: 10.9 MMOL/L (ref 20–26)
HCT VFR BLD AUTO: 39.8 % (ref 34–46.6)
HCT VFR BLD CALC: 42.1 % (ref 38–51)
HGB BLD-MCNC: 13.8 G/DL (ref 12–15.9)
HGB BLDA-MCNC: 13.7 G/DL (ref 13.5–17.5)
HGB UR QL STRIP.AUTO: ABNORMAL
HYALINE CASTS UR QL AUTO: ABNORMAL /LPF
IMM GRANULOCYTES # BLD AUTO: 0.22 10*3/MM3 (ref 0–0.05)
IMM GRANULOCYTES NFR BLD AUTO: 0.9 % (ref 0–0.5)
INHALED O2 CONCENTRATION: 21 %
INR PPP: 1.12 (ref 0.9–1.1)
KETONES UR QL STRIP: ABNORMAL
LEUKOCYTE ESTERASE UR QL STRIP.AUTO: ABNORMAL
LYMPHOCYTES # BLD AUTO: 0.53 10*3/MM3 (ref 0.7–3.1)
LYMPHOCYTES NFR BLD AUTO: 2.3 % (ref 19.6–45.3)
Lab: ABNORMAL
MCH RBC QN AUTO: 28.8 PG (ref 26.6–33)
MCHC RBC AUTO-ENTMCNC: 34.7 G/DL (ref 31.5–35.7)
MCV RBC AUTO: 83.1 FL (ref 79–97)
METHGB BLD QL: 0.3 % (ref 0–3)
MODALITY: ABNORMAL
MONOCYTES # BLD AUTO: 1.16 10*3/MM3 (ref 0.1–0.9)
MONOCYTES NFR BLD AUTO: 5 % (ref 5–12)
NEUTROPHILS NFR BLD AUTO: 21.23 10*3/MM3 (ref 1.7–7)
NEUTROPHILS NFR BLD AUTO: 91.7 % (ref 42.7–76)
NITRITE UR QL STRIP: NEGATIVE
NOTE: ABNORMAL
NRBC BLD AUTO-RTO: 0 /100 WBC (ref 0–0.2)
NT-PROBNP SERPL-MCNC: 4348 PG/ML (ref 0–900)
OXYHGB MFR BLDV: 96.4 % (ref 94–99)
PCO2 BLDA: 25.1 MM HG (ref 35–45)
PCO2 TEMP ADJ BLD: ABNORMAL MM[HG]
PH BLDA: 7.25 PH UNITS (ref 7.35–7.45)
PH UR STRIP.AUTO: <=5 [PH] (ref 5–8)
PH, TEMP CORRECTED: ABNORMAL
PLATELET # BLD AUTO: 320 10*3/MM3 (ref 140–450)
PMV BLD AUTO: 10 FL (ref 6–12)
PO2 BLDA: 103 MM HG (ref 83–108)
PO2 TEMP ADJ BLD: ABNORMAL MM[HG]
POTASSIUM SERPL-SCNC: 6 MMOL/L (ref 3.5–5.2)
POTASSIUM SERPL-SCNC: 6.2 MMOL/L (ref 3.5–5.2)
PROCALCITONIN SERPL-MCNC: 0.46 NG/ML (ref 0–0.25)
PROT SERPL-MCNC: 8.7 G/DL (ref 6–8.5)
PROT UR QL STRIP: ABNORMAL
PROTHROMBIN TIME: 14.6 SECONDS (ref 12.1–14.7)
QT INTERVAL: 422 MS
QT INTERVAL: 468 MS
QTC INTERVAL: 388 MS
QTC INTERVAL: 468 MS
RBC # BLD AUTO: 4.79 10*6/MM3 (ref 3.77–5.28)
RBC # UR STRIP: ABNORMAL /HPF
REF LAB TEST METHOD: ABNORMAL
SAO2 % BLDCOA: 97.6 % (ref 94–99)
SARS-COV-2 RNA RESP QL NAA+PROBE: NOT DETECTED
SODIUM SERPL-SCNC: 110 MMOL/L (ref 136–145)
SODIUM SERPL-SCNC: 116 MMOL/L (ref 136–145)
SP GR UR STRIP: 1.02 (ref 1–1.03)
SQUAMOUS #/AREA URNS HPF: ABNORMAL /HPF
TROPONIN T SERPL-MCNC: 0.02 NG/ML (ref 0–0.03)
TSH SERPL DL<=0.05 MIU/L-ACNC: 2.01 UIU/ML (ref 0.27–4.2)
UROBILINOGEN UR QL STRIP: ABNORMAL
VENTILATOR MODE: ABNORMAL
WBC # UR STRIP: ABNORMAL /HPF
WBC NRBC COR # BLD: 23.18 10*3/MM3 (ref 3.4–10.8)
YEAST URNS QL MICRO: ABNORMAL /HPF

## 2023-02-03 PROCEDURE — 71045 X-RAY EXAM CHEST 1 VIEW: CPT | Performed by: RADIOLOGY

## 2023-02-03 PROCEDURE — 85610 PROTHROMBIN TIME: CPT | Performed by: PHYSICIAN ASSISTANT

## 2023-02-03 PROCEDURE — 25010000002 PROCHLORPERAZINE 10 MG/2ML SOLUTION: Performed by: PHYSICIAN ASSISTANT

## 2023-02-03 PROCEDURE — 36600 WITHDRAWAL OF ARTERIAL BLOOD: CPT

## 2023-02-03 PROCEDURE — 83050 HGB METHEMOGLOBIN QUAN: CPT

## 2023-02-03 PROCEDURE — 25010000002 ONDANSETRON PER 1 MG: Performed by: PHYSICIAN ASSISTANT

## 2023-02-03 PROCEDURE — 80053 COMPREHEN METABOLIC PANEL: CPT | Performed by: PHYSICIAN ASSISTANT

## 2023-02-03 PROCEDURE — 96366 THER/PROPH/DIAG IV INF ADDON: CPT

## 2023-02-03 PROCEDURE — 99285 EMERGENCY DEPT VISIT HI MDM: CPT

## 2023-02-03 PROCEDURE — 84443 ASSAY THYROID STIM HORMONE: CPT | Performed by: STUDENT IN AN ORGANIZED HEALTH CARE EDUCATION/TRAINING PROGRAM

## 2023-02-03 PROCEDURE — 71045 X-RAY EXAM CHEST 1 VIEW: CPT

## 2023-02-03 PROCEDURE — 85025 COMPLETE CBC W/AUTO DIFF WBC: CPT | Performed by: PHYSICIAN ASSISTANT

## 2023-02-03 PROCEDURE — C1751 CATH, INF, PER/CENT/MIDLINE: HCPCS

## 2023-02-03 PROCEDURE — 83880 ASSAY OF NATRIURETIC PEPTIDE: CPT | Performed by: PHYSICIAN ASSISTANT

## 2023-02-03 PROCEDURE — 87040 BLOOD CULTURE FOR BACTERIA: CPT | Performed by: PHYSICIAN ASSISTANT

## 2023-02-03 PROCEDURE — 81001 URINALYSIS AUTO W/SCOPE: CPT | Performed by: PHYSICIAN ASSISTANT

## 2023-02-03 PROCEDURE — 83605 ASSAY OF LACTIC ACID: CPT | Performed by: PHYSICIAN ASSISTANT

## 2023-02-03 PROCEDURE — 99284 EMERGENCY DEPT VISIT MOD MDM: CPT | Performed by: INTERNAL MEDICINE

## 2023-02-03 PROCEDURE — 93010 ELECTROCARDIOGRAM REPORT: CPT | Performed by: INTERNAL MEDICINE

## 2023-02-03 PROCEDURE — 25010000002 PIPERACILLIN SOD-TAZOBACTAM PER 1 G: Performed by: PHYSICIAN ASSISTANT

## 2023-02-03 PROCEDURE — 71250 CT THORAX DX C-: CPT

## 2023-02-03 PROCEDURE — 82550 ASSAY OF CK (CPK): CPT | Performed by: STUDENT IN AN ORGANIZED HEALTH CARE EDUCATION/TRAINING PROGRAM

## 2023-02-03 PROCEDURE — 84484 ASSAY OF TROPONIN QUANT: CPT | Performed by: PHYSICIAN ASSISTANT

## 2023-02-03 PROCEDURE — 86140 C-REACTIVE PROTEIN: CPT | Performed by: PHYSICIAN ASSISTANT

## 2023-02-03 PROCEDURE — 25010000002: Performed by: NURSE PRACTITIONER

## 2023-02-03 PROCEDURE — 85730 THROMBOPLASTIN TIME PARTIAL: CPT | Performed by: PHYSICIAN ASSISTANT

## 2023-02-03 PROCEDURE — 93005 ELECTROCARDIOGRAM TRACING: CPT | Performed by: PHYSICIAN ASSISTANT

## 2023-02-03 PROCEDURE — 74176 CT ABD & PELVIS W/O CONTRAST: CPT

## 2023-02-03 PROCEDURE — 82962 GLUCOSE BLOOD TEST: CPT

## 2023-02-03 PROCEDURE — 82375 ASSAY CARBOXYHB QUANT: CPT

## 2023-02-03 PROCEDURE — 82805 BLOOD GASES W/O2 SATURATION: CPT

## 2023-02-03 PROCEDURE — 84145 PROCALCITONIN (PCT): CPT | Performed by: HOSPITALIST

## 2023-02-03 PROCEDURE — 96368 THER/DIAG CONCURRENT INF: CPT

## 2023-02-03 PROCEDURE — 96365 THER/PROPH/DIAG IV INF INIT: CPT

## 2023-02-03 PROCEDURE — 70450 CT HEAD/BRAIN W/O DYE: CPT

## 2023-02-03 PROCEDURE — 36415 COLL VENOUS BLD VENIPUNCTURE: CPT

## 2023-02-03 PROCEDURE — 96376 TX/PRO/DX INJ SAME DRUG ADON: CPT

## 2023-02-03 PROCEDURE — 99284 EMERGENCY DEPT VISIT MOD MDM: CPT

## 2023-02-03 PROCEDURE — 25010000002 VANCOMYCIN 5 G RECONSTITUTED SOLUTION: Performed by: PHYSICIAN ASSISTANT

## 2023-02-03 PROCEDURE — 93005 ELECTROCARDIOGRAM TRACING: CPT | Performed by: EMERGENCY MEDICINE

## 2023-02-03 PROCEDURE — 70450 CT HEAD/BRAIN W/O DYE: CPT | Performed by: RADIOLOGY

## 2023-02-03 PROCEDURE — 87636 SARSCOV2 & INF A&B AMP PRB: CPT | Performed by: PHYSICIAN ASSISTANT

## 2023-02-03 PROCEDURE — 25010000002 DOPAMINE PER 40 MG: Performed by: STUDENT IN AN ORGANIZED HEALTH CARE EDUCATION/TRAINING PROGRAM

## 2023-02-03 PROCEDURE — 96375 TX/PRO/DX INJ NEW DRUG ADDON: CPT

## 2023-02-03 RX ORDER — DOPAMINE HYDROCHLORIDE 160 MG/100ML
2-20 INJECTION, SOLUTION INTRAVENOUS
Status: DISCONTINUED | OUTPATIENT
Start: 2023-02-03 | End: 2023-02-04 | Stop reason: HOSPADM

## 2023-02-03 RX ORDER — SODIUM CHLORIDE 0.9 % (FLUSH) 0.9 %
10 SYRINGE (ML) INJECTION AS NEEDED
Status: DISCONTINUED | OUTPATIENT
Start: 2023-02-03 | End: 2023-02-04 | Stop reason: HOSPADM

## 2023-02-03 RX ORDER — ONDANSETRON 4 MG/1
4 TABLET, ORALLY DISINTEGRATING ORAL ONCE
Status: DISCONTINUED | OUTPATIENT
Start: 2023-02-03 | End: 2023-02-03

## 2023-02-03 RX ORDER — PROCHLORPERAZINE EDISYLATE 5 MG/ML
10 INJECTION INTRAMUSCULAR; INTRAVENOUS ONCE
Status: COMPLETED | OUTPATIENT
Start: 2023-02-03 | End: 2023-02-03

## 2023-02-03 RX ORDER — ONDANSETRON 2 MG/ML
4 INJECTION INTRAMUSCULAR; INTRAVENOUS ONCE
Status: COMPLETED | OUTPATIENT
Start: 2023-02-03 | End: 2023-02-03

## 2023-02-03 RX ADMIN — PIPERACILLIN SODIUM AND TAZOBACTAM SODIUM 3.38 G: 3; .375 INJECTION, POWDER, LYOPHILIZED, FOR SOLUTION INTRAVENOUS at 17:59

## 2023-02-03 RX ADMIN — SODIUM BICARBONATE 50 MEQ: 84 INJECTION INTRAVENOUS at 19:19

## 2023-02-03 RX ADMIN — SODIUM CHLORIDE 1986 ML: 9 INJECTION, SOLUTION INTRAVENOUS at 17:56

## 2023-02-03 RX ADMIN — DOPAMINE HYDROCHLORIDE 17.5 MCG/KG/MIN: 160 INJECTION, SOLUTION INTRAVENOUS at 23:00

## 2023-02-03 RX ADMIN — GLUCAGON 60 MG/HR: KIT at 17:24

## 2023-02-03 RX ADMIN — VANCOMYCIN HYDROCHLORIDE 2500 MG: 5 INJECTION, POWDER, LYOPHILIZED, FOR SOLUTION INTRAVENOUS at 19:10

## 2023-02-03 RX ADMIN — ONDANSETRON 4 MG: 2 INJECTION INTRAMUSCULAR; INTRAVENOUS at 17:32

## 2023-02-03 RX ADMIN — PROCHLORPERAZINE EDISYLATE 10 MG: 5 INJECTION INTRAMUSCULAR; INTRAVENOUS at 17:58

## 2023-02-03 RX ADMIN — DOPAMINE HYDROCHLORIDE 2 MCG/KG/MIN: 160 INJECTION, SOLUTION INTRAVENOUS at 15:18

## 2023-02-03 RX ADMIN — DOPAMINE HYDROCHLORIDE 15 MCG/KG/MIN: 160 INJECTION, SOLUTION INTRAVENOUS at 19:52

## 2023-02-03 NOTE — ED PROVIDER NOTES
Subjective   History of Present Illness  This is a 67 year old female patient who presents to the ER with chief complaint of weakness. Patient is from home and lives with her grandson. She called EMS who brought her in today. PMH significant for chronic lymphedema of the BLE, HTN (treated with a beta blocker) and HLD. She says for several days, she has felt very weak and lethargic. She also says that the last time she had any urine production was 2 days ago. She denies chest pain, SOB, palpitations, nausea, vomiting.     History provided by:  Patient   used: No        Review of Systems   Constitutional: Negative for fever.   HENT: Negative.    Respiratory: Negative.    Cardiovascular: Negative.  Negative for chest pain.   Gastrointestinal: Negative.  Negative for abdominal pain.   Endocrine: Negative.    Genitourinary: Positive for difficulty urinating. Negative for decreased urine volume, dyspareunia, dysuria, enuresis, flank pain, frequency, genital sores, hematuria, menstrual problem, pelvic pain, urgency, vaginal bleeding, vaginal discharge and vaginal pain.   Skin: Negative.    Neurological: Positive for weakness. Negative for dizziness, tremors, seizures, syncope, facial asymmetry, speech difficulty, light-headedness, numbness and headaches.   Psychiatric/Behavioral: Negative.    All other systems reviewed and are negative.      Past Medical History:   Diagnosis Date   • Hyperlipidemia    • Hypertension        No Known Allergies    Past Surgical History:   Procedure Laterality Date   • CHOLECYSTECTOMY         Family History   Problem Relation Age of Onset   • Hypertension Mother        Social History     Socioeconomic History   • Marital status: Unknown   Tobacco Use   • Smoking status: Never   • Smokeless tobacco: Never   Substance and Sexual Activity   • Alcohol use: Never   • Drug use: Never           Objective   Physical Exam  Vitals and nursing note reviewed.   Constitutional:        General: She is not in acute distress.     Appearance: She is well-developed. She is obese. She is ill-appearing and toxic-appearing. She is not diaphoretic.   HENT:      Head: Normocephalic and atraumatic.      Right Ear: External ear normal.      Left Ear: External ear normal.      Nose: Nose normal.   Eyes:      Conjunctiva/sclera: Conjunctivae normal.      Pupils: Pupils are equal, round, and reactive to light.   Neck:      Vascular: No JVD.      Trachea: No tracheal deviation.   Cardiovascular:      Rate and Rhythm: Bradycardia present. Rhythm irregular.      Heart sounds: Normal heart sounds. No murmur heard.  Pulmonary:      Effort: Pulmonary effort is normal. No respiratory distress.      Breath sounds: Wheezing present.   Abdominal:      General: Bowel sounds are normal.      Palpations: Abdomen is soft.      Tenderness: There is no abdominal tenderness.   Musculoskeletal:         General: No deformity. Normal range of motion.      Cervical back: Normal range of motion and neck supple.      Right lower leg: Edema present.      Left lower leg: Edema present.   Skin:     General: Skin is warm and dry.      Coloration: Skin is not pale.      Findings: No erythema or rash.   Neurological:      General: No focal deficit present.      Mental Status: She is alert and oriented to person, place, and time. Mental status is at baseline.      Cranial Nerves: No cranial nerve deficit.      Motor: Weakness present.   Psychiatric:         Behavior: Behavior normal.         Thought Content: Thought content normal.         Procedures       Results for orders placed or performed during the hospital encounter of 02/03/23   COVID-19 and FLU A/B PCR - Swab, Nasopharynx    Specimen: Nasopharynx; Swab   Result Value Ref Range    COVID19 Not Detected Not Detected - Ref. Range    Influenza A PCR Not Detected Not Detected    Influenza B PCR Not Detected Not Detected   Blood Culture - Blood, Arm, Right    Specimen: Arm, Right; Blood    Result Value Ref Range    Blood Culture No growth at 5 days    Blood Culture - Blood, Arm, Left    Specimen: Arm, Left; Blood   Result Value Ref Range    Blood Culture No growth at 5 days    Comprehensive Metabolic Panel    Specimen: Blood   Result Value Ref Range    Glucose 254 (H) 65 - 99 mg/dL     (H) 8 - 23 mg/dL    Creatinine 7.51 (H) 0.57 - 1.00 mg/dL    Sodium 110 (C) 136 - 145 mmol/L    Potassium 6.2 (C) 3.5 - 5.2 mmol/L    Chloride 75 (L) 98 - 107 mmol/L    CO2 11.5 (L) 22.0 - 29.0 mmol/L    Calcium 8.6 8.6 - 10.5 mg/dL    Total Protein 8.7 (H) 6.0 - 8.5 g/dL    Albumin 3.6 3.5 - 5.2 g/dL    ALT (SGPT) 22 1 - 33 U/L    AST (SGOT) 17 1 - 32 U/L    Alkaline Phosphatase 159 (H) 39 - 117 U/L    Total Bilirubin 0.6 0.0 - 1.2 mg/dL    Globulin 5.1 gm/dL    A/G Ratio 0.7 g/dL    BUN/Creatinine Ratio 23.3 7.0 - 25.0    Anion Gap 23.5 (H) 5.0 - 15.0 mmol/L    eGFR 5.5 (L) >60.0 mL/min/1.73   Urinalysis With Microscopic If Indicated (No Culture) - Urine, Clean Catch    Specimen: Urine, Clean Catch   Result Value Ref Range    Color, UA Dark Yellow (A) Yellow, Straw    Appearance, UA Turbid (A) Clear    pH, UA <=5.0 5.0 - 8.0    Specific Gravity, UA 1.019 1.005 - 1.030    Glucose, UA Negative Negative    Ketones, UA Trace (A) Negative    Bilirubin, UA Small (1+) (A) Negative    Blood, UA Large (3+) (A) Negative    Protein, UA 30 mg/dL (1+) (A) Negative    Leuk Esterase, UA Moderate (2+) (A) Negative    Nitrite, UA Negative Negative    Urobilinogen, UA 0.2 E.U./dL 0.2 - 1.0 E.U./dL   Troponin    Specimen: Blood   Result Value Ref Range    Troponin T 0.017 0.000 - 0.030 ng/mL   BNP    Specimen: Blood   Result Value Ref Range    proBNP 4,348.0 (H) 0.0 - 900.0 pg/mL   C-reactive Protein    Specimen: Blood   Result Value Ref Range    C-Reactive Protein 4.82 (H) 0.00 - 0.50 mg/dL   Lactic Acid, Plasma    Specimen: Blood   Result Value Ref Range    Lactate 1.8 0.5 - 2.0 mmol/L   CBC Auto Differential    Specimen:  Blood   Result Value Ref Range    WBC 23.18 (H) 3.40 - 10.80 10*3/mm3    RBC 4.79 3.77 - 5.28 10*6/mm3    Hemoglobin 13.8 12.0 - 15.9 g/dL    Hematocrit 39.8 34.0 - 46.6 %    MCV 83.1 79.0 - 97.0 fL    MCH 28.8 26.6 - 33.0 pg    MCHC 34.7 31.5 - 35.7 g/dL    RDW 17.8 (H) 12.3 - 15.4 %    RDW-SD 53.2 37.0 - 54.0 fl    MPV 10.0 6.0 - 12.0 fL    Platelets 320 140 - 450 10*3/mm3    Neutrophil % 91.7 (H) 42.7 - 76.0 %    Lymphocyte % 2.3 (L) 19.6 - 45.3 %    Monocyte % 5.0 5.0 - 12.0 %    Eosinophil % 0.0 (L) 0.3 - 6.2 %    Basophil % 0.1 0.0 - 1.5 %    Immature Grans % 0.9 (H) 0.0 - 0.5 %    Neutrophils, Absolute 21.23 (H) 1.70 - 7.00 10*3/mm3    Lymphocytes, Absolute 0.53 (L) 0.70 - 3.10 10*3/mm3    Monocytes, Absolute 1.16 (H) 0.10 - 0.90 10*3/mm3    Eosinophils, Absolute 0.01 0.00 - 0.40 10*3/mm3    Basophils, Absolute 0.03 0.00 - 0.20 10*3/mm3    Immature Grans, Absolute 0.22 (H) 0.00 - 0.05 10*3/mm3    nRBC 0.0 0.0 - 0.2 /100 WBC   Protime-INR    Specimen: Blood   Result Value Ref Range    Protime 14.6 12.1 - 14.7 Seconds    INR 1.12 (H) 0.90 - 1.10   aPTT    Specimen: Blood   Result Value Ref Range    PTT 35.1 (H) 26.5 - 34.5 seconds   TSH    Specimen: Blood   Result Value Ref Range    TSH 2.010 0.270 - 4.200 uIU/mL   Blood Gas, Arterial With Co-Ox    Specimen: Arterial Blood   Result Value Ref Range    Site Left Brachial     Rikki's Test N/A     pH, Arterial 7.246 (L) 7.350 - 7.450 pH units    pCO2, Arterial 25.1 (L) 35.0 - 45.0 mm Hg    pO2, Arterial 103.0 83.0 - 108.0 mm Hg    HCO3, Arterial 10.9 (L) 20.0 - 26.0 mmol/L    Base Excess, Arterial -14.6 (L) 0.0 - 2.0 mmol/L    O2 Saturation, Arterial 97.6 94.0 - 99.0 %    Hemoglobin, Blood Gas 13.7 13.5 - 17.5 g/dL    Hematocrit, Blood Gas 42.1 38.0 - 51.0 %    Oxyhemoglobin 96.4 94 - 99 %    Methemoglobin 0.30 0.00 - 3.00 %    Carboxyhemoglobin 0.9 0 - 5 %    A-a DO2 13.1 0.0 - 300.0 mmHg    CO2 Content 11.7 (L) 22 - 33 mmol/L    Temperature 0.0 C    Barometric  Pressure for Blood Gas 737 mmHg    Modality Room Air     FIO2 21 %    Ventilator Mode NA     Note      Collected by [id number]     pH, Temp Corrected      pCO2, Temperature Corrected      pO2, Temperature Corrected     CK    Specimen: Blood   Result Value Ref Range    Creatine Kinase 32 20 - 180 U/L   Basic Metabolic Panel    Specimen: Blood   Result Value Ref Range    Glucose 353 (H) 65 - 99 mg/dL     (H) 8 - 23 mg/dL    Creatinine 7.50 (H) 0.57 - 1.00 mg/dL    Sodium 116 (C) 136 - 145 mmol/L    Potassium 6.0 (H) 3.5 - 5.2 mmol/L    Chloride 81 (L) 98 - 107 mmol/L    CO2 11.6 (L) 22.0 - 29.0 mmol/L    Calcium 7.7 (L) 8.6 - 10.5 mg/dL    BUN/Creatinine Ratio 23.6 7.0 - 25.0    Anion Gap 23.4 (H) 5.0 - 15.0 mmol/L    eGFR 5.5 (L) >60.0 mL/min/1.73   Basic Metabolic Panel    Specimen: Blood   Result Value Ref Range    Glucose 249 (H) 65 - 99 mg/dL     (H) 8 - 23 mg/dL    Creatinine 7.06 (H) 0.57 - 1.00 mg/dL    Sodium 120 (L) 136 - 145 mmol/L    Potassium 5.6 (H) 3.5 - 5.2 mmol/L    Chloride 84 (L) 98 - 107 mmol/L    CO2 12.6 (L) 22.0 - 29.0 mmol/L    Calcium 7.7 (L) 8.6 - 10.5 mg/dL    BUN/Creatinine Ratio 24.1 7.0 - 25.0    Anion Gap 23.4 (H) 5.0 - 15.0 mmol/L    eGFR 5.9 (L) >60.0 mL/min/1.73   Procalcitonin    Specimen: Blood   Result Value Ref Range    Procalcitonin 0.46 (H) 0.00 - 0.25 ng/mL   Urinalysis, Microscopic Only - Urine, Clean Catch    Specimen: Urine, Clean Catch   Result Value Ref Range    RBC, UA 31-50 (A) None Seen, 0-2 /HPF    WBC, UA 21-30 (A) None Seen, 0-2 /HPF    Bacteria, UA Trace (A) None Seen /HPF    Squamous Epithelial Cells, UA 7-12 (A) None Seen, 0-2 /HPF    Yeast, UA Large/3+ Budding Yeast None Seen /HPF    Hyaline Casts, UA 13-20 None Seen /LPF    Methodology Manual Light Microscopy    POC Glucose Once    Specimen: Blood   Result Value Ref Range    Glucose 233 (H) 70 - 130 mg/dL   ECG 12 Lead Bradycardia   Result Value Ref Range    QT Interval 468 ms    QTC Interval 468 ms    ECG 12 Lead Bradycardia   Result Value Ref Range    QT Interval 422 ms    QTC Interval 388 ms   ECG 12 Lead Chest Pain   Result Value Ref Range    QT Interval 430 ms    QTC Interval 411 ms     CT Abdomen Pelvis Without Contrast   Final Result      No acute CT abnormality in the abdomen or pelvis.         Signer Name: Ezio Boyer MD    Signed: 2/3/2023 7:26 PM    Workstation Name: [place] [id number]     Radiology [facility]      CT Chest Without Contrast Diagnostic   Final Result      1.  Bibasilar pulmonary opacities favored to reflect atelectasis.   2.  Cardiomegaly. Coronary artery calcifications.      Signer Name: Ezio Boyer MD    Signed: 2/3/2023 7:23 PM    Workstation Name: [place] [id number]     Radiology [facility]      XR Chest AP   Final Result   1.  Right IJ deep line with tip at the cavoatrial junction.   2.  Cardiomegaly and pulmonary vascular congestion.   3.  No pneumothorax identified.       This report was finalized on 2/3/2023 5:06 PM by Dr. Delvin Hartley MD.          CT Head Without Contrast   Final Result   1.  Stable exam demonstrating mild diffuse cerebral atrophy and advanced   chronic small vessel ischemic disease with chronic appearing lacunar   infarcts.   2.  No CT evidence of acute intracranial abnormality.   3.  Mild acute left sphenoid sinusitis.       This report was finalized on 2/3/2023 5:06 PM by Dr. Delvin Hartley MD.          XR Chest 1 View   Final Result     Stable cardiomegaly. No acute changes identified.       This report was finalized on 2/3/2023 4:22 PM by Dr. Delvin Hartley MD.              ED Course  ED Course as of 03/09/23 1120   Fri Feb 03, 2023   1511 Dr. Wilson reviewed the patient's EKG and sent to Dr. Dinero, interventional cardiology on call, who confirmed it is not a STEMI but is more concerned about heart block. He asked us to place her on a dopamine infusion.  [MM]   1639 Dr. Wilson successfully placed a central line in this patient without  immediate complications.  [MM]   1640 XR Chest 1 View  IMPRESSION:    Stable cardiomegaly. No acute changes identified.     This report was finalized on 2/3/2023 4:22 PM by Dr. Delvin Hartley MD. [MM]   1718 XR Chest AP  IMPRESSION:  1.  Right IJ deep line with tip at the cavoatrial junction.  2.  Cardiomegaly and pulmonary vascular congestion.  3.  No pneumothorax identified.     This report was finalized on 2/3/2023 5:06 PM by Dr. Delvin Hartley MD [MM]   1718 CT Head Without Contrast  IMPRESSION:  1.  Stable exam demonstrating mild diffuse cerebral atrophy and advanced  chronic small vessel ischemic disease with chronic appearing lacunar  infarcts.  2.  No CT evidence of acute intracranial abnormality.  3.  Mild acute left sphenoid sinusitis.     This report was finalized on 2/3/2023 5:06 PM by Dr. Delvin Hartley MD [MM]   1934 CT Abdomen Pelvis Without Contrast  IMPRESSION:     No acute CT abnormality in the abdomen or pelvis.        Signer Name: Ezio Boyer MD   Signed: 2/3/2023 7:26 PM   Workstation Name: [place] [id number]    [facility] [MM]   1934 CT Chest Without Contrast Diagnostic  IMPRESSION:     1.  Bibasilar pulmonary opacities favored to reflect atelectasis.  2.  Cardiomegaly. Coronary artery calcifications.     Signer Name: Ezio Boyer MD   Signed: 2/3/2023 7:23 PM   Workstation Name: [place] [id number]    [facility] [MM]   2046 Spoke with DR. Parsons who says she requires PCU or CCU and we don't have any of those beds available.  [MM]   2202 Spoke with Dr. [facility]pc[facility] at [facility]  [MM]   2210 Spoke with Dr. Concepcion, nephrology, who says he would likely be unable to dialysis the patient since his case load is already so busy. As a result, Dr. Concepcion isn't comfortable accepting her in transfer.  [MM]   2217 EKG 2207 unclear rhythm 50 bpm, regular rate QRS 72, QTc 411, regular axis, poor R wave progression.  No STEMI.    Electronically signed by Delvin Zambrano MD,  03/01/23, 7:56 AM EST.   [KP]   2220 Received patient from previous midlevel provider Hanna Pérez PA-C at shift change. Patient vitals are stable. Patient is requiring Dopamine for bradycardia and hypotension. Patient requiring 2LNC at this time. Patient has medical history positive for HLD, HTN and lymphedema. Patient denies any new swelling in extremities that this is chronic. Patient reports generalized weakness X1week with difficulty urinating. Patient was able to urinate successfully during visit to the ER. Patient denies any additional symptoms today. To Vas score of 3. Case discussed with Dr. Wilson at this time.   [SM]   2306 Discussed case with Dr. Wilson he is reaching back out to [facility].  []   Sat Feb 04, 2023   0016 Discussed with Dr. Kaur at [facility] nephrology advises to start a BiCarb drip 75meq at 100ml/hr and give insulin sliding scale. Accepts patient as speciality.  []   0031 Discussed case with Dr. Gaona hospitalist at [facility] accepts patient to his service.  [SM]      ED Course User Index  [KP] Delvin Zambrano MD  [MM] Hanna Pérez PA  [] Quyen Perdue, APRN                                           [facility]    Final diagnoses:   CARLOS (acute kidney injury) (HCC)   Hyponatremia   Hyperkalemia   Sepsis with acute renal failure without septic shock, due to unspecified organism, unspecified acute renal failure type (HCC)   Acute congestive heart failure, unspecified heart failure type (HCC)   Acute cystitis with hematuria   Heart block       ED Disposition  ED Disposition     ED Disposition   Transfer to Another Facility     Condition   --    Comment   --             No follow-up provider specified.       Medication List      No changes were made to your prescriptions during this visit.          Quyen Perdue, APRN  02/04/23 0251       Quyen Perdue, APRN  03/09/23 1120

## 2023-02-03 NOTE — ED PROCEDURE NOTE
Place of Service:[facility] EMERGENCY DEPARTMENT  Patient Name:Nathalia Landa  :1955    Procedure  Central Line At Bedside    Date/Time: 2/3/2023 4:41 PM  Performed by: Aaron Wilson DO  Authorized by: Jasvir Sanders MD     Consent:     Consent obtained:  Verbal    Consent given by:  Patient    Risks, benefits, and alternatives were discussed: yes      Risks discussed:  Incorrect placement, arterial puncture, nerve damage, bleeding and infection    Alternatives discussed:  Delayed treatment  Universal protocol:     Procedure explained and questions answered to patient or proxy's satisfaction: yes      Relevant documents present and verified: yes      Test results available: yes      Imaging studies available: yes      Required blood products, implants, devices, and special equipment available: yes      Site/side marked: yes      Immediately prior to procedure, a time out was called: yes      Patient identity confirmed:  Verbally with patient  Pre-procedure details:     Indication(s): central venous access and insufficient peripheral access      Hand hygiene: Hand hygiene performed prior to insertion      Sterile barrier technique: All elements of maximal sterile technique followed      Skin preparation:  Chlorhexidine    Skin preparation agent: Skin preparation agent completely dried prior to procedure    Sedation:     Sedation type:  None  Anesthesia:     Anesthesia method:  Local infiltration    Local anesthetic:  Lidocaine 1% w/o epi  Procedure details:     Location:  R internal jugular    Patient position:  Supine    Procedural supplies:  Triple lumen    Catheter size:  7 Fr    Landmarks identified: yes      Ultrasound guidance: yes      Ultrasound guidance timing: real time      Sterile ultrasound techniques: Sterile gel and sterile probe covers were used      Number of attempts:  1    Successful placement: yes    Post-procedure details:     Post-procedure:  Dressing applied  and line sutured    Assessment:  Blood return through all ports, free fluid flow and no pneumothorax on x-ray    Procedure completion:  Tolerated well, no immediate complications              Aaron Wilson DO  02/03/23 3568

## 2023-02-03 NOTE — CONSULTS
.  Patient Identification:  Name:  Nathalia Landa  Age:  67 y.o.  Sex:  female  :  1955  MRN:  [id number]  Visit Number:  [id number]  Date of Admit: 2/3/2023  Date of Consult: 23  No ref. provider found    Chief Complaint:   Evaluate for bradycardia    Subjective:    Patient is a 67-year-old female with past medical history significant hypertension, recent admission for cellulitis.  Came in with complaints of feeling dizzy lightheaded.  Patient was noted to have heart rate in 30s to 40s.  She was in A-fib.  Patient has been taking beta-blockers twice daily for hypertension control.  Patient denies any chest pains.  Denies any dyspnea on exertion.  Patient has been feeling woozy and lightheaded.  Denies any nausea vomiting.  Patient leg wound has healed up and the cellulitis is improved after recent admission.      Past Medical History:   Diagnosis Date   • Hyperlipidemia    • Hypertension      Past Surgical History:   Procedure Laterality Date   • CHOLECYSTECTOMY       Family History   Problem Relation Age of Onset   • Hypertension Mother      Social History     Tobacco Use   • Smoking status: Never   • Smokeless tobacco: Never   Substance Use Topics   • Alcohol use: Never   • Drug use: Never     (Not in a hospital admission)    Allergies:  Patient has no known allergies.        ----------------------------------------------------------------------------------------------------------------------  Current Hospital Meds:  glucagon infusion 0.1 mg/mL, 60 mg/hr, Intravenous, Once  ondansetron ODT, 4 mg, Oral, Once      DOPamine, 2-20 mcg/kg/min, Last Rate: 10 mcg/kg/min (23 1550)      ----------------------------------------------------------------------------------------------------------------------  Vital Signs:  Temp:  [96.8 °F (36 °C)] 96.8 °F (36 °C)  Heart Rate:  [38-51] 51  Resp:  [20] 20  BP: (81-98)/(44-74) 98/66      23  1503   Weight: (!) 141 kg (310 lb)     Body mass index is 45.78  kg/m².  No intake or output data in the 24 hours ending 02/03/23 4536  No diet orders on file  ----------------------------------------------------------------------------------------------------------------------  Physical exam:  Constitutional:    HENT:  Head:  Normocephalic and atraumatic.    Eyes:  Conjunctivae and EOM are normal.  Pupils are equal, round, and reactive to light.  No scleral icterus.    Neck:  Neck supple.  No JVD present.    Cardiovascular: Normal rate, regular rhythm, S1 S2+, NO S3 / S4  Pulmonary/Chest:  Vesicular breath sounds B/L  Abdominal:  Soft.  Bowel sounds are normal.  No distension and no tenderness.      Neurological:  Alert and oriented to person, place, and time. No focal deficits.  Skin:  Skin is warm and dry. No rash noted. No pallor.   Musculoskeletal:  No edema, no tenderness, and no deformity.  No red or swollen joints anywhere.   Peripheral vascular:  2+ Pulses B/L DP  ----------------------------------------------------------------------------------------------------------------------    ----------------------------------------------------------------------------------------------------------------------          Results from last 7 days   Lab Units 02/03/23  1509   PH, ARTERIAL pH units 7.246*   PO2 ART mm Hg 103.0   PCO2, ARTERIAL mm Hg 25.1*   HCO3 ART mmol/L 10.9*           Invalid input(s): PROTEstimated Creatinine Clearance: 76.7 mL/min (A) (by C-G formula based on SCr of 1.08 mg/dL (H)).    No results found for: AMMONIA      No results found for: BLOODCX  No results found for: URINECX  No results found for: WOUNDCX  No results found for: STOOLCX  Echo:  Results for orders placed during the hospital encounter of 01/05/23    Adult Transthoracic Echo Complete w/ Color, Spectral and Contrast if necessary per protocol    Interpretation Summary  •  Left ventricular systolic function is normal. Left ventricular ejection fraction appears to be greater than 70%.  •  Left  ventricular wall thickness is consistent with mild to moderate concentric hypertrophy.  •  Left ventricular diastolic function was normal.    ECG/EMG Results (last 24 hours)     Procedure Component Value Units Date/Time    ECG 12 Lead Bradycardia [[id number]] Collected: 02/03/23 1449     Updated: 02/03/23 1612     QT Interval 422 ms      QTC Interval 388 ms     Narrative:      Test Reason : Bradycardia  Blood Pressure :   */*   mmHG  Vent. Rate :  51 BPM     Atrial Rate :  51 BPM     P-R Int :  80 ms          QRS Dur :  78 ms      QT Int : 422 ms       P-R-T Axes :   * -18 100 degrees     QTc Int : 388 ms      Sinus bradycardia with short OR with premature supraventricular complexes  Inferior infarct (cited on or before 05-JAN-2023)  Anterolateral infarct (cited on or before 05-JAN-2023)    Consider right ventricular involvement in acute inferior infarct  Abnormal ECG  When compared with ECG of 07-JAN-2023 06:19,  Significant changes have occurred  Confirmed by Ruslan Gracia ([id number]) on 2/3/2023 4:12:42 PM    Referred By: CARLTON           Confirmed By: Ruslan Gracia    ECG 12 Lead Bradycardia [[id number]] Collected: 02/03/23 1501     Updated: 02/03/23 1613     QT Interval 468 ms      QTC Interval 468 ms     Narrative:      Test Reason : Bradycardia  Blood Pressure :   */*   mmHG  Vent. Rate :  60 BPM     Atrial Rate : 357 BPM     P-R Int :   * ms          QRS Dur : 100 ms      QT Int : 468 ms       P-R-T Axes :   *  26  52 degrees     QTc Int : 468 ms    Atrial fibrillation  Low voltage QRS  Cannot rule out Anterior infarct (cited on or before 05-JAN-2023)  Abnormal ECG  When compared with ECG of 03-FEB-2023 14:49, (Unconfirmed)  Atrial fibrillation has replaced Sinus rhythm  QRS duration has increased  Criteria for Inferior infarct are no longer present  Serial changes of Anterior infarct present  Confirmed by Ruslan Gracia ([id number]) on 2/3/2023 4:13:28 PM    Referred By: CARLTON           Confirmed By: Ruslan Gracia         Imaging Results (Last 72 Hours)     Procedure Component Value Units Date/Time    XR Chest 1 View [[id number]] Collected: 02/03/23 1622     Updated: 02/03/23 1624    Narrative:      EXAM:    XR Chest, 1 View     EXAM DATE:    2/3/2023 3:51 PM     CLINICAL HISTORY:    weakness     TECHNIQUE:    Frontal view of the chest.     COMPARISON:    01/05/2023     FINDINGS:    Lungs:  Unremarkable.  No consolidation.    Pleural space:  Unremarkable.  No pneumothorax.    Heart:  Cardiomegaly.    Mediastinum:  Unremarkable.    Bones/joints:  Unremarkable.       Impression:        Stable cardiomegaly. No acute changes identified.     This report was finalized on 2/3/2023 4:22 PM by Dr. Delvin Hartley MD.       CT Head Without Contrast [[id number]] Resulted: 02/03/23 1611     Updated: 02/03/23 1613        Cardiolite (Tc-99m Sestamibi) stress test       I have personally looked at the labs and they are summarized above.  ----------------------------------------------------------------------------------------------------------------------  Imaging Results (Last 24 Hours)     Procedure Component Value Units Date/Time    XR Chest 1 View [[id number]] Collected: 02/03/23 1622     Updated: 02/03/23 1624    Narrative:      EXAM:    XR Chest, 1 View     EXAM DATE:    2/3/2023 3:51 PM     CLINICAL HISTORY:    weakness     TECHNIQUE:    Frontal view of the chest.     COMPARISON:    01/05/2023     FINDINGS:    Lungs:  Unremarkable.  No consolidation.    Pleural space:  Unremarkable.  No pneumothorax.    Heart:  Cardiomegaly.    Mediastinum:  Unremarkable.    Bones/joints:  Unremarkable.       Impression:        Stable cardiomegaly. No acute changes identified.     This report was finalized on 2/3/2023 4:22 PM by Dr. Delvin Hartley MD.       CT Head Without Contrast [[id number]] Resulted: 02/03/23 1611     Updated: 02/03/23 1613         ----------------------------------------------------------------------------------------------------------------------    Assessment:  Bradycardia  Hypertension  Atrial fibrillation    Plan:  #1 cardiac.  Patient with case of bradycardia.  Patient has been on metoprolol twice daily for hypertension control.  Patient was noted to be in A-fib with slow rate.  Patient had a recent admission for cellulitis.  Patient has been in sinus rhythm previously.    Patient has To Vas score of 3.  Will stop beta-blockers.  May reverse with glucagon.  If heart rate is not come back up, may need permanent pacemaker implantation.  Agree with keeping on dopamine for now also.  She would need to be started on IV heparin weight-based due to new onset A-fib.  This would need to be transitioned to Eliquis also prior to discharge no previous history of any stress test noted.  Need to rule out hypothyroidism, ischemic heart disease as etiology of her new onset A-fib.        This document has been electronically signed by Samia Dinero MD [facility], Interventional Cardiology  February 3, 2023 16:36 EST   .Electronically signed by Samia Dinero MD, 02/03/23, 4:45 PM EST.

## 2023-02-04 VITALS
HEART RATE: 60 BPM | BODY MASS INDEX: 43.4 KG/M2 | HEIGHT: 69 IN | WEIGHT: 293 LBS | TEMPERATURE: 98.4 F | RESPIRATION RATE: 17 BRPM | OXYGEN SATURATION: 100 % | DIASTOLIC BLOOD PRESSURE: 74 MMHG | SYSTOLIC BLOOD PRESSURE: 122 MMHG

## 2023-02-04 LAB
ANION GAP SERPL CALCULATED.3IONS-SCNC: 23.4 MMOL/L (ref 5–15)
BUN SERPL-MCNC: 170 MG/DL (ref 8–23)
BUN/CREAT SERPL: 24.1 (ref 7–25)
CALCIUM SPEC-SCNC: 7.7 MG/DL (ref 8.6–10.5)
CHLORIDE SERPL-SCNC: 84 MMOL/L (ref 98–107)
CO2 SERPL-SCNC: 12.6 MMOL/L (ref 22–29)
CREAT SERPL-MCNC: 7.06 MG/DL (ref 0.57–1)
EGFRCR SERPLBLD CKD-EPI 2021: 5.9 ML/MIN/1.73
GLUCOSE SERPL-MCNC: 249 MG/DL (ref 65–99)
POTASSIUM SERPL-SCNC: 5.6 MMOL/L (ref 3.5–5.2)
QT INTERVAL: 430 MS
QTC INTERVAL: 411 MS
SODIUM SERPL-SCNC: 120 MMOL/L (ref 136–145)

## 2023-02-04 PROCEDURE — 96366 THER/PROPH/DIAG IV INF ADDON: CPT

## 2023-02-04 PROCEDURE — 80048 BASIC METABOLIC PNL TOTAL CA: CPT | Performed by: STUDENT IN AN ORGANIZED HEALTH CARE EDUCATION/TRAINING PROGRAM

## 2023-02-04 PROCEDURE — 96367 TX/PROPH/DG ADDL SEQ IV INF: CPT

## 2023-02-04 RX ORDER — INSULIN ASPART 100 [IU]/ML
0-7 INJECTION, SOLUTION INTRAVENOUS; SUBCUTANEOUS
Status: DISCONTINUED | OUTPATIENT
Start: 2023-02-04 | End: 2023-02-04 | Stop reason: HOSPADM

## 2023-02-04 RX ORDER — DEXTROSE MONOHYDRATE 25 G/50ML
25 INJECTION, SOLUTION INTRAVENOUS
Status: DISCONTINUED | OUTPATIENT
Start: 2023-02-04 | End: 2023-02-04 | Stop reason: HOSPADM

## 2023-02-04 RX ORDER — NICOTINE POLACRILEX 4 MG
15 LOZENGE BUCCAL
Status: DISCONTINUED | OUTPATIENT
Start: 2023-02-04 | End: 2023-02-04 | Stop reason: HOSPADM

## 2023-02-04 RX ORDER — DEXTROSE MONOHYDRATE 50 MG/ML
100 INJECTION, SOLUTION INTRAVENOUS CONTINUOUS
Status: DISCONTINUED | OUTPATIENT
Start: 2023-02-04 | End: 2023-02-04 | Stop reason: SDUPTHER

## 2023-02-04 RX ADMIN — SODIUM BICARBONATE 75 MEQ: 84 INJECTION, SOLUTION INTRAVENOUS at 01:45

## 2023-02-04 NOTE — ED NOTES
Called [facility] requesting possible transfer. Awaiting return call from Nephrologist at [facility].

## 2023-02-08 ENCOUNTER — READMISSION MANAGEMENT (OUTPATIENT)
Dept: CALL CENTER | Facility: HOSPITAL | Age: 68
End: 2023-02-08
Payer: MEDICARE

## 2023-02-08 LAB
BACTERIA SPEC AEROBE CULT: NORMAL
BACTERIA SPEC AEROBE CULT: NORMAL

## 2023-02-08 NOTE — OUTREACH NOTE
Medical Week 4 Survey    Flowsheet Row Responses   [facility] facility patient discharged from? [facility]   Does the patient have one of the following disease processes/diagnoses(primary or secondary)? Other   Week 4 attempt successful? No   Call start time 1807   Revoke Readmitted  [pt currently admitted to [facility] in [place]]          Torri MARES - Registered Nurse

## 2023-03-31 ENCOUNTER — TRANSCRIBE ORDERS (OUTPATIENT)
Dept: WOUND CARE | Facility: HOSPITAL | Age: 68
End: 2023-03-31
Payer: MEDICARE

## 2023-03-31 DIAGNOSIS — I89.0 LYMPHEDEMA: Primary | ICD-10-CM
